# Patient Record
Sex: FEMALE | Race: WHITE | Employment: OTHER | URBAN - METROPOLITAN AREA
[De-identification: names, ages, dates, MRNs, and addresses within clinical notes are randomized per-mention and may not be internally consistent; named-entity substitution may affect disease eponyms.]

---

## 2017-01-13 ENCOUNTER — APPOINTMENT (OUTPATIENT)
Dept: LAB | Facility: HOSPITAL | Age: 72
End: 2017-01-13
Attending: INTERNAL MEDICINE
Payer: MEDICARE

## 2017-01-13 DIAGNOSIS — E04.2 MULTINODULAR THYROID: ICD-10-CM

## 2017-01-13 LAB — TSI SER-ACNC: 1.23 MIU/ML (ref 0.35–5.5)

## 2017-01-13 PROCEDURE — 36415 COLL VENOUS BLD VENIPUNCTURE: CPT

## 2017-01-13 PROCEDURE — 84443 ASSAY THYROID STIM HORMONE: CPT

## 2017-02-06 ENCOUNTER — OFFICE VISIT (OUTPATIENT)
Dept: INTERNAL MEDICINE CLINIC | Facility: CLINIC | Age: 72
End: 2017-02-06

## 2017-02-06 VITALS
TEMPERATURE: 98 F | RESPIRATION RATE: 16 BRPM | BODY MASS INDEX: 34 KG/M2 | HEART RATE: 108 BPM | WEIGHT: 179 LBS | DIASTOLIC BLOOD PRESSURE: 80 MMHG | SYSTOLIC BLOOD PRESSURE: 110 MMHG

## 2017-02-06 DIAGNOSIS — J01.90 ACUTE SINUSITIS, RECURRENCE NOT SPECIFIED, UNSPECIFIED LOCATION: Primary | ICD-10-CM

## 2017-02-06 PROCEDURE — 99213 OFFICE O/P EST LOW 20 MIN: CPT | Performed by: PHYSICIAN ASSISTANT

## 2017-02-06 RX ORDER — AMOXICILLIN AND CLAVULANATE POTASSIUM 875; 125 MG/1; MG/1
1 TABLET, FILM COATED ORAL 2 TIMES DAILY
Qty: 20 TABLET | Refills: 0 | Status: SHIPPED | OUTPATIENT
Start: 2017-02-06 | End: 2017-02-16

## 2017-02-06 NOTE — PROGRESS NOTES
Sandra Kevin is a 70year old female  Patient presents with:  Sinus Problem: cough, PND, sinus pressure.   sx's x 2 weeks       HPI:   Pt states that she has been sick for 2 weeks now  - has thick PND, nasal drainage and productive cough  - worse at n developed, well nourished,in no apparent distress  SKIN: no rashes,no suspicious lesions  HEENT: atraumatic, normocephalic, purulent nasal drainage; + maxillary sinus tenderness; normal oropharynx without tonsillar erythema/edema , no pallor or icterus  NE

## 2017-02-24 ENCOUNTER — TELEPHONE (OUTPATIENT)
Dept: INTERNAL MEDICINE CLINIC | Facility: CLINIC | Age: 72
End: 2017-02-24

## 2017-02-24 NOTE — TELEPHONE ENCOUNTER
Incoming (mail or fax):  fax  Received from:  [facility]-test results  Documentation given to:  Triage-in DR. Srivastava's test results folder.

## 2017-04-28 ENCOUNTER — TELEPHONE (OUTPATIENT)
Dept: INTERNAL MEDICINE CLINIC | Facility: CLINIC | Age: 72
End: 2017-04-28

## 2017-04-28 NOTE — TELEPHONE ENCOUNTER
Pt called today but on message said not available to receive call back today. To advise (or ask further questions) Monday, May 1.

## 2017-04-28 NOTE — TELEPHONE ENCOUNTER
Pt would like verbal ok to start a new diet, [facility] Metabolic Program, Dr Faraz Zaidi ph [phone]. Please advise on Monday May 1 or after. Thank you.

## 2017-05-04 NOTE — TELEPHONE ENCOUNTER
Spoke to pt, she will have Dr Quintanilla Keep forward information on the diet to the office. Will forward upon receipt.

## 2017-05-08 NOTE — TELEPHONE ENCOUNTER
Dr. Piero Camacho called. Advised as below that Dr. Siobhan Jeffery would like information about diet and gave fax information. Dr. Piero Camacho voiced understanding. Stated that is there are questions she can be reached at [phone].

## 2017-10-10 ENCOUNTER — MA CHART PREP (OUTPATIENT)
Dept: FAMILY MEDICINE CLINIC | Facility: CLINIC | Age: 72
End: 2017-10-10

## 2017-10-16 ENCOUNTER — OFFICE VISIT (OUTPATIENT)
Dept: INTERNAL MEDICINE CLINIC | Facility: CLINIC | Age: 72
End: 2017-10-16

## 2017-10-16 VITALS
DIASTOLIC BLOOD PRESSURE: 70 MMHG | SYSTOLIC BLOOD PRESSURE: 100 MMHG | RESPIRATION RATE: 20 BRPM | HEIGHT: 61 IN | BODY MASS INDEX: 27.75 KG/M2 | HEART RATE: 85 BPM | WEIGHT: 147 LBS | OXYGEN SATURATION: 98 %

## 2017-10-16 DIAGNOSIS — L71.9 ACNE ROSACEA: ICD-10-CM

## 2017-10-16 DIAGNOSIS — Z11.59 NEED FOR HEPATITIS C SCREENING TEST: ICD-10-CM

## 2017-10-16 DIAGNOSIS — E78.2 MIXED HYPERLIPIDEMIA: ICD-10-CM

## 2017-10-16 DIAGNOSIS — E66.3 OVERWEIGHT (BMI 25.0-29.9): ICD-10-CM

## 2017-10-16 DIAGNOSIS — I87.2 VENOUS INSUFFICIENCY: ICD-10-CM

## 2017-10-16 DIAGNOSIS — Z12.31 VISIT FOR SCREENING MAMMOGRAM: ICD-10-CM

## 2017-10-16 DIAGNOSIS — R94.31 ABNORMAL EKG: ICD-10-CM

## 2017-10-16 DIAGNOSIS — I10 ESSENTIAL HYPERTENSION: ICD-10-CM

## 2017-10-16 DIAGNOSIS — Z12.11 COLON CANCER SCREENING: Primary | ICD-10-CM

## 2017-10-16 DIAGNOSIS — E04.2 MULTINODULAR THYROID: ICD-10-CM

## 2017-10-16 DIAGNOSIS — Z00.00 ENCOUNTER FOR ANNUAL HEALTH EXAMINATION: ICD-10-CM

## 2017-10-16 DIAGNOSIS — M17.0 PRIMARY OSTEOARTHRITIS OF BOTH KNEES: ICD-10-CM

## 2017-10-16 PROCEDURE — G0008 ADMIN INFLUENZA VIRUS VAC: HCPCS | Performed by: INTERNAL MEDICINE

## 2017-10-16 PROCEDURE — 96160 PT-FOCUSED HLTH RISK ASSMT: CPT | Performed by: INTERNAL MEDICINE

## 2017-10-16 PROCEDURE — 90653 IIV ADJUVANT VACCINE IM: CPT | Performed by: INTERNAL MEDICINE

## 2017-10-16 PROCEDURE — G0439 PPPS, SUBSEQ VISIT: HCPCS | Performed by: INTERNAL MEDICINE

## 2017-10-16 NOTE — PROGRESS NOTES
HPI:   Miriam Frances is a 67year old female who presents for a MA (Medicare Advantage) Supervisit (Once per calendar year).       Annual Physical due on 12/13/2017        Patient Care Team: Patient Care Team:  Pearl Alexander DO as PCP - General Elena Whitmore unspecified whether generalized or localized, lower leg (6/20/2013); Tendinitis; Thyroid nodule; and Thyroid nodule.     She  has a past surgical history that includes knee replacement surgery (7/26/2010); other surgical history (1/11/2010); other (5/22/15) turbinates mildly swollen bilateral  EYES: PERRLA, EOMI, conjunctiva are clear  NECK: supple, no adenopathy, no bruits, +thyromegaly, + right sided  nodules  CHEST: no chest tenderness  BREAST: no masses, no discharge or no axillary lymphadenopathy bilater 1 year ago for varicose veins with Dr Abdoulaye Yuan:   Diagnoses and all orders for this visit:    Encounter for annual health examination    Visit for screening mammogram  -     RICARDO SCREENING BILAT (CPT=77067);  Future    Need for hepatitis C Status     Hearing Problems?: No    Vision Problems? : No    Difficulty walking?: No    Difficulty dressing or bathing?: No    Problems with daily activities? : No    Memory Problems?: No      Fall/Risk Assessment     Do you have 3 or more medical conditio Electrocardiogram date11/28/2016       Colorectal Cancer Screening      Colonoscopy Screen every 10 years Colonoscopy,10 Years due on 07/25/1995 Update Health Maintenance if applicable    Flex Sigmoidoscopy Screen every 10 years No results found for this o Not covered by Medicare Part B No vaccine history found This may be covered with your prescription benefits, but Medicare does not cover unless Medically needed    Zoster  Not covered by Medicare Part B No vaccine history found This may be covered with you

## 2017-10-16 NOTE — PATIENT INSTRUCTIONS
Yuridia Adams's SCREENING SCHEDULE   Tests on this list are recommended by your physician but may not be covered, or covered at this frequency, by your insurer. Please check with your insurance carrier before scheduling to verify coverage.    PREVENT criteria:   • Men who are 73-68 years old and have smoked more than 100 cigarettes in their lifetime   • Anyone with a family history    Colorectal Cancer Screening  Covered up to Age 76     Colonoscopy Screen   Covered every 10 years- more often if abnorm Orders placed or performed in visit on 12/13/16  -FLU VACC PRSV FREE INC ANTIG   Orders placed or performed in visit on 12/03/15  -FLU VACC PRSV FREE INC ANTIG    Please get every year    Pneumococcal 13 (Prevnar)  Covered Once after 65   Orders placed o available in [address])  [email or web address]  This link also has information from the [address] [place] [address] regarding Advance Directives.

## 2017-10-17 ENCOUNTER — MED REC SCAN ONLY (OUTPATIENT)
Dept: INTERNAL MEDICINE CLINIC | Facility: CLINIC | Age: 72
End: 2017-10-17

## 2017-11-07 ENCOUNTER — APPOINTMENT (OUTPATIENT)
Dept: LAB | Facility: HOSPITAL | Age: 72
End: 2017-11-07
Attending: INTERNAL MEDICINE
Payer: MEDICARE

## 2017-11-07 DIAGNOSIS — I10 ESSENTIAL HYPERTENSION: ICD-10-CM

## 2017-11-07 DIAGNOSIS — E04.2 MULTINODULAR THYROID: ICD-10-CM

## 2017-11-07 DIAGNOSIS — E78.2 MIXED HYPERLIPIDEMIA: ICD-10-CM

## 2017-11-07 DIAGNOSIS — Z11.59 NEED FOR HEPATITIS C SCREENING TEST: ICD-10-CM

## 2017-11-07 PROCEDURE — 36415 COLL VENOUS BLD VENIPUNCTURE: CPT

## 2017-11-07 PROCEDURE — 80053 COMPREHEN METABOLIC PANEL: CPT

## 2017-11-07 PROCEDURE — 86803 HEPATITIS C AB TEST: CPT

## 2017-11-07 PROCEDURE — 80061 LIPID PANEL: CPT

## 2017-11-07 PROCEDURE — 84443 ASSAY THYROID STIM HORMONE: CPT

## 2017-11-09 ENCOUNTER — HOSPITAL ENCOUNTER (OUTPATIENT)
Dept: ULTRASOUND IMAGING | Facility: HOSPITAL | Age: 72
Discharge: HOME | End: 2017-11-09
Attending: INTERNAL MEDICINE
Payer: MEDICARE

## 2017-11-09 DIAGNOSIS — E04.2 MULTINODULAR THYROID: ICD-10-CM

## 2017-11-09 PROCEDURE — 76536 US EXAM OF HEAD AND NECK: CPT | Performed by: INTERNAL MEDICINE

## 2017-11-15 ENCOUNTER — HOSPITAL ENCOUNTER (OUTPATIENT)
Dept: MAMMOGRAPHY | Age: 72
Discharge: HOME | End: 2017-11-15
Attending: INTERNAL MEDICINE
Payer: MEDICARE

## 2017-11-15 DIAGNOSIS — Z12.31 VISIT FOR SCREENING MAMMOGRAM: ICD-10-CM

## 2017-11-15 PROCEDURE — 77067 SCR MAMMO BI INCL CAD: CPT | Performed by: INTERNAL MEDICINE

## 2017-12-10 ENCOUNTER — APPOINTMENT (OUTPATIENT)
Dept: LAB | Age: 72
End: 2017-12-10
Attending: INTERNAL MEDICINE
Payer: MEDICARE

## 2017-12-10 PROCEDURE — 82272 OCCULT BLD FECES 1-3 TESTS: CPT

## 2018-02-06 ENCOUNTER — TELEPHONE (OUTPATIENT)
Dept: INTERNAL MEDICINE CLINIC | Facility: CLINIC | Age: 73
End: 2018-02-06

## 2018-07-10 ENCOUNTER — TELEPHONE (OUTPATIENT)
Dept: INTERNAL MEDICINE CLINIC | Facility: CLINIC | Age: 73
End: 2018-07-10

## 2018-08-09 ENCOUNTER — HOSPITAL ENCOUNTER (OUTPATIENT)
Dept: ULTRASOUND IMAGING | Facility: HOSPITAL | Age: 73
Discharge: HOME | End: 2018-08-09
Attending: OTOLARYNGOLOGY
Payer: MEDICARE

## 2018-08-09 DIAGNOSIS — E04.1 THYROID NODULE: ICD-10-CM

## 2018-08-09 PROCEDURE — 76536 US EXAM OF HEAD AND NECK: CPT | Performed by: OTOLARYNGOLOGY

## 2018-08-13 ENCOUNTER — HOSPITAL ENCOUNTER (OUTPATIENT)
Dept: ULTRASOUND IMAGING | Facility: HOSPITAL | Age: 73
Discharge: HOME | End: 2018-08-13
Attending: OTOLARYNGOLOGY
Payer: MEDICARE

## 2018-08-13 DIAGNOSIS — E04.1 THYROID NODULE: ICD-10-CM

## 2018-08-13 DIAGNOSIS — E04.2 MULTINODULAR THYROID: ICD-10-CM

## 2018-08-13 PROCEDURE — 76942 ECHO GUIDE FOR BIOPSY: CPT | Performed by: OTOLARYNGOLOGY

## 2018-08-13 PROCEDURE — 88173 CYTOPATH EVAL FNA REPORT: CPT | Performed by: OTOLARYNGOLOGY

## 2018-08-13 PROCEDURE — 10022 US FNA THYROID (CPT=10022/76942): CPT | Performed by: OTOLARYNGOLOGY

## 2018-08-20 NOTE — PROGRESS NOTES
Per phone consent [phone] Home. Notified of results and recommendations and verbalized understanding.  Future order placed

## 2018-09-06 ENCOUNTER — TELEPHONE (OUTPATIENT)
Dept: INTERNAL MEDICINE CLINIC | Facility: CLINIC | Age: 73
End: 2018-09-06

## 2018-09-06 NOTE — TELEPHONE ENCOUNTER
Incoming (mail or fax):   Fax  Received from: [facility]  Documentation given to: Triage

## 2018-09-07 NOTE — TELEPHONE ENCOUNTER
Received a fax from Sonia Huerta, the Coordinator at [place] inquiring about a claim from 02/27/18 for services at [facility], questioning if Dr. Eun Quick directed the service.  Indicated on the fax back to Sonia Huerta that Dr. Eun Quick has never seen this radha

## 2018-09-28 NOTE — PROGRESS NOTES
HPI:   Alejandro Dukes is a 68year old female who presents with UTI symptoms.      Symptoms started 1 week ago  +Dysuria  +suprapubic pressure  Denies hematuria  +Urinary frequency  +urinary urgency  Denies fever/chills  + low back pains but denies flan localized, lower leg 6/20/2013    Specify: LT Log Date: 07/26/2011    • Tendinitis    • Thyroid nodule    • Thyroid nodule       Past Surgical History:  7/26/2010: KNEE REPLACEMENT SURGERY      Comment:  L knee unicompartment replacement, unicondylar knee urine culture-pt unable to void again in office; gave wipe and urine cup to take home and bring back  abx with probiotic  Drink plenty of water, avoid holding bladder, wipe front to back  RTC in 1 week if not better or sooner if needed  Medicare physical s

## 2018-10-01 ENCOUNTER — OFFICE VISIT (OUTPATIENT)
Dept: FAMILY MEDICINE CLINIC | Facility: CLINIC | Age: 73
End: 2018-10-01

## 2018-10-01 VITALS
BODY MASS INDEX: 27.79 KG/M2 | DIASTOLIC BLOOD PRESSURE: 76 MMHG | OXYGEN SATURATION: 98 % | HEIGHT: 62 IN | RESPIRATION RATE: 16 BRPM | WEIGHT: 151 LBS | TEMPERATURE: 97 F | HEART RATE: 92 BPM | SYSTOLIC BLOOD PRESSURE: 118 MMHG

## 2018-10-01 DIAGNOSIS — R30.0 DYSURIA: Primary | ICD-10-CM

## 2018-10-01 PROCEDURE — 81003 URINALYSIS AUTO W/O SCOPE: CPT | Performed by: PHYSICIAN ASSISTANT

## 2018-10-01 PROCEDURE — 99203 OFFICE O/P NEW LOW 30 MIN: CPT | Performed by: PHYSICIAN ASSISTANT

## 2018-10-01 PROCEDURE — 87086 URINE CULTURE/COLONY COUNT: CPT | Performed by: PHYSICIAN ASSISTANT

## 2018-10-01 RX ORDER — SULFAMETHOXAZOLE AND TRIMETHOPRIM 800; 160 MG/1; MG/1
1 TABLET ORAL 2 TIMES DAILY
Qty: 10 TABLET | Refills: 0 | Status: SHIPPED | OUTPATIENT
Start: 2018-10-01 | End: 2018-10-16 | Stop reason: ALTCHOICE

## 2018-10-29 ENCOUNTER — OFFICE VISIT (OUTPATIENT)
Dept: FAMILY MEDICINE CLINIC | Facility: CLINIC | Age: 73
End: 2018-10-29
Payer: MEDICARE

## 2018-10-29 VITALS
HEART RATE: 80 BPM | SYSTOLIC BLOOD PRESSURE: 126 MMHG | WEIGHT: 151.63 LBS | HEIGHT: 61.5 IN | RESPIRATION RATE: 14 BRPM | DIASTOLIC BLOOD PRESSURE: 88 MMHG | BODY MASS INDEX: 28.26 KG/M2 | TEMPERATURE: 98 F

## 2018-10-29 DIAGNOSIS — L71.9 ACNE ROSACEA: ICD-10-CM

## 2018-10-29 DIAGNOSIS — E04.2 MULTINODULAR THYROID: ICD-10-CM

## 2018-10-29 DIAGNOSIS — Z23 FLU VACCINE NEED: ICD-10-CM

## 2018-10-29 DIAGNOSIS — I87.2 VENOUS INSUFFICIENCY: ICD-10-CM

## 2018-10-29 DIAGNOSIS — E53.8 B12 DEFICIENCY: ICD-10-CM

## 2018-10-29 DIAGNOSIS — Z12.11 COLON CANCER SCREENING: ICD-10-CM

## 2018-10-29 DIAGNOSIS — I10 ESSENTIAL HYPERTENSION: ICD-10-CM

## 2018-10-29 DIAGNOSIS — E55.9 VITAMIN D DEFICIENCY: ICD-10-CM

## 2018-10-29 DIAGNOSIS — Z12.31 ENCOUNTER FOR SCREENING MAMMOGRAM FOR HIGH-RISK PATIENT: ICD-10-CM

## 2018-10-29 DIAGNOSIS — E78.00 PURE HYPERCHOLESTEROLEMIA: ICD-10-CM

## 2018-10-29 DIAGNOSIS — Z00.00 ENCOUNTER FOR ANNUAL HEALTH EXAMINATION: Primary | ICD-10-CM

## 2018-10-29 DIAGNOSIS — M15.8 OTHER OSTEOARTHRITIS INVOLVING MULTIPLE JOINTS: ICD-10-CM

## 2018-10-29 PROCEDURE — G0008 ADMIN INFLUENZA VIRUS VAC: HCPCS | Performed by: FAMILY MEDICINE

## 2018-10-29 PROCEDURE — G0439 PPPS, SUBSEQ VISIT: HCPCS | Performed by: FAMILY MEDICINE

## 2018-10-29 PROCEDURE — 90653 IIV ADJUVANT VACCINE IM: CPT | Performed by: FAMILY MEDICINE

## 2018-10-29 PROCEDURE — 96160 PT-FOCUSED HLTH RISK ASSMT: CPT | Performed by: FAMILY MEDICINE

## 2018-10-29 NOTE — PROGRESS NOTES
HPI:   Irish Bains is a 68year old female who presents for a MA (Medicare Advantage) Supervisit (Once per calendar year).       Her last annual assessment has been over 1 year: Annual Physical due on 10/16/2018         Fall/Risk Assessment   She h sees DERM      Venous insufficiency- stable monitor      Overweight (BMI 25.0-29. 9)- discussed diet and exercise     Wt Readings from Last 3 Encounters:  10/29/18 : 151 lb 9.6 oz  10/01/18 : 151 lb  11/20/17 : 145 lb     Last Cholesterol Labs:   Lab Result includes knee replacement surgery (2010); other surgical history (2010); other (5/22/15); and .     Her family history includes Breast Cancer in an other family member; CAD in her father; Cancer in her father and another family member; Heart D Both Eyes Chart Acuity: 20/20   Able To Tolerate Visual Acuity: Yes      General Appearance:  Alert, cooperative, no distress, appears stated age   Head:  Normocephalic, without obvious abnormality, atraumatic   Eyes:  PERRL, conjunctiva/corneas clear, EOM is a 68year old female who presents for a Medicare Assessment.      PLAN SUMMARY:   Diagnoses and all orders for this visit:    Encounter for annual health examination    Acne rosacea- stable cpm    Other osteoarthritis involving multiple joints- stable cp Sugar (FSB)Annually Glucose (mg/dL)   Date Value   11/07/2017 83     GLUCOSE (mg/dL)   Date Value   10/07/2013 91          Cardiovascular Disease Screening     LDL Annually LDL CHOLESTROL (mg/dL)   Date Value   10/07/2013 86     LDL Cholesterol (mg/dL)   D birthday    Hepatitis B for Moderate/High Risk No vaccine history found Medium/high risk factors:   End-stage renal disease   Hemophiliacs who received Factor VIII or IX concentrates   Clients of institutions for the mentally retarded   Persons who live in

## 2018-10-29 NOTE — PATIENT INSTRUCTIONS
Bear Adams's SCREENING SCHEDULE   Tests on this list are recommended by your physician but may not be covered, or covered at this frequency, by your insurer. Please check with your insurance carrier before scheduling to verify coverage.    PREVENT 73-68 years old and have smoked more than 100 cigarettes in their lifetime   • Anyone with a family history    Colorectal Cancer Screening  Covered up to Age 76     Colonoscopy Screen   Covered every 10 years- more often if abnormal Colonoscopy due on 07/2 Please get this Mammogram regularly   Immunizations      Influenza  Covered Annually Orders placed or performed in visit on 12/13/16   • FLU VACC [address] ANTIG   Orders placed or performed in visit on 12/03/15   • FLU VACC PRSV FREE [facility] ANTIG    Plea review and print using their home computer and printer. (the forms are also available in [address] [facility])  [email or web address]  This link also has information from the [facility] regarding Advance Directives.

## 2018-12-03 ENCOUNTER — APPOINTMENT (OUTPATIENT)
Dept: LAB | Facility: HOSPITAL | Age: 73
End: 2018-12-03
Attending: FAMILY MEDICINE
Payer: MEDICARE

## 2018-12-03 DIAGNOSIS — Z12.11 COLON CANCER SCREENING: ICD-10-CM

## 2018-12-03 PROCEDURE — 82274 ASSAY TEST FOR BLOOD FECAL: CPT

## 2018-12-07 ENCOUNTER — APPOINTMENT (OUTPATIENT)
Dept: LAB | Age: 73
End: 2018-12-07
Attending: FAMILY MEDICINE
Payer: MEDICARE

## 2018-12-07 DIAGNOSIS — E04.2 MULTINODULAR THYROID: ICD-10-CM

## 2018-12-07 DIAGNOSIS — E78.00 PURE HYPERCHOLESTEROLEMIA: ICD-10-CM

## 2018-12-07 DIAGNOSIS — E53.8 B12 DEFICIENCY: ICD-10-CM

## 2018-12-07 DIAGNOSIS — E55.9 VITAMIN D DEFICIENCY: ICD-10-CM

## 2018-12-07 DIAGNOSIS — I10 ESSENTIAL HYPERTENSION: ICD-10-CM

## 2018-12-07 PROCEDURE — 80061 LIPID PANEL: CPT

## 2018-12-07 PROCEDURE — 82607 VITAMIN B-12: CPT

## 2018-12-07 PROCEDURE — 84443 ASSAY THYROID STIM HORMONE: CPT

## 2018-12-07 PROCEDURE — 82306 VITAMIN D 25 HYDROXY: CPT

## 2018-12-07 PROCEDURE — 83735 ASSAY OF MAGNESIUM: CPT

## 2018-12-07 PROCEDURE — 84439 ASSAY OF FREE THYROXINE: CPT

## 2018-12-07 PROCEDURE — 36415 COLL VENOUS BLD VENIPUNCTURE: CPT

## 2018-12-07 PROCEDURE — 80053 COMPREHEN METABOLIC PANEL: CPT

## 2019-03-25 ENCOUNTER — OFFICE VISIT (OUTPATIENT)
Dept: FAMILY MEDICINE CLINIC | Facility: CLINIC | Age: 74
End: 2019-03-25
Payer: COMMERCIAL

## 2019-03-25 VITALS
DIASTOLIC BLOOD PRESSURE: 68 MMHG | SYSTOLIC BLOOD PRESSURE: 120 MMHG | OXYGEN SATURATION: 97 % | TEMPERATURE: 98 F | BODY MASS INDEX: 28.32 KG/M2 | HEART RATE: 82 BPM | HEIGHT: 61 IN | WEIGHT: 150 LBS

## 2019-03-25 DIAGNOSIS — J02.9 SORE THROAT: Primary | ICD-10-CM

## 2019-03-25 DIAGNOSIS — J02.0 STREP PHARYNGITIS: ICD-10-CM

## 2019-03-25 LAB — CONTROL LINE PRESENT WITH A CLEAR BACKGROUND (YES/NO): YES YES/NO

## 2019-03-25 PROCEDURE — 87880 STREP A ASSAY W/OPTIC: CPT | Performed by: FAMILY MEDICINE

## 2019-03-25 PROCEDURE — 99213 OFFICE O/P EST LOW 20 MIN: CPT | Performed by: FAMILY MEDICINE

## 2019-03-25 RX ORDER — CEFDINIR 300 MG/1
300 CAPSULE ORAL 2 TIMES DAILY
Qty: 20 CAPSULE | Refills: 0 | Status: SHIPPED | OUTPATIENT
Start: 2019-03-25 | End: 2019-04-08

## 2019-03-25 NOTE — PROGRESS NOTES
CHIEF COMPLAINT:   Patient presents with:  Sore Throat: x 1 dy       HPI:   Miriam Frances is a 68year old female presents to clinic with symptoms of sore throat. Patient has had for 1 days. Symptoms have been getting worse since onset.   Patient repor Specify: LT Log Date: 07/26/2011    • Tendinitis    • Thyroid nodule    • Thyroid nodule       Social History:  Social History    Tobacco Use      Smoking status: Never Smoker      Smokeless tobacco: Never Used    Alcohol use: No      Alcohol/week: 0.0 Refills for this Visit:  Requested Prescriptions     Signed Prescriptions Disp Refills   • cefdinir 300 MG Oral Cap 20 capsule 0     Sig: Take 1 capsule (300 mg total) by mouth 2 (two) times daily.        Imaging & Consults:  None      Risks, benefits, comp

## 2019-03-25 NOTE — PATIENT INSTRUCTIONS
Take antibiotics with food and plenty of water. Eat yogurt or take probiotic daily. (Morales Kitchen is a good example of an OTC probiotic)  Make sure to finish the entire antibiotic treatment. Increase fluids and rest.   Use otc meds as needed for comfort.    Ibu

## 2019-03-27 ENCOUNTER — TELEPHONE (OUTPATIENT)
Dept: FAMILY MEDICINE CLINIC | Facility: CLINIC | Age: 74
End: 2019-03-27

## 2019-03-27 RX ORDER — AMOXICILLIN 500 MG/1
500 CAPSULE ORAL 3 TIMES DAILY
Qty: 30 CAPSULE | Refills: 0 | Status: SHIPPED | OUTPATIENT
Start: 2019-03-27 | End: 2019-04-06

## 2019-03-27 NOTE — TELEPHONE ENCOUNTER
Patient called the clinic and believes she is having a reaction to the Cefdinir she was prescribed on 3/25/19 for strep pharyngitis.   The patient reports for the past 2 days she has had black stools and reports she had a headache this morning so severe she

## 2019-10-23 ENCOUNTER — PATIENT OUTREACH (OUTPATIENT)
Dept: FAMILY MEDICINE CLINIC | Facility: CLINIC | Age: 74
End: 2019-10-23

## 2019-11-24 ENCOUNTER — MA CHART PREP (OUTPATIENT)
Dept: FAMILY MEDICINE CLINIC | Facility: CLINIC | Age: 74
End: 2019-11-24

## 2019-11-24 NOTE — TELEPHONE ENCOUNTER
Chart reviewed as MA Chart Prep for upcoming Medicare Advantage visit scheduled for 11/26/2019. No new potential HCC diagnoses identified.

## 2019-11-26 ENCOUNTER — OFFICE VISIT (OUTPATIENT)
Dept: FAMILY MEDICINE CLINIC | Facility: CLINIC | Age: 74
End: 2019-11-26
Payer: COMMERCIAL

## 2019-11-26 VITALS
OXYGEN SATURATION: 98 % | SYSTOLIC BLOOD PRESSURE: 132 MMHG | RESPIRATION RATE: 18 BRPM | TEMPERATURE: 98 F | HEIGHT: 61 IN | BODY MASS INDEX: 29.64 KG/M2 | DIASTOLIC BLOOD PRESSURE: 74 MMHG | WEIGHT: 157 LBS | HEART RATE: 86 BPM

## 2019-11-26 DIAGNOSIS — Z12.11 COLON CANCER SCREENING: ICD-10-CM

## 2019-11-26 DIAGNOSIS — I87.2 VENOUS INSUFFICIENCY: ICD-10-CM

## 2019-11-26 DIAGNOSIS — L71.9 ACNE ROSACEA: ICD-10-CM

## 2019-11-26 DIAGNOSIS — M15.9 PRIMARY OSTEOARTHRITIS INVOLVING MULTIPLE JOINTS: ICD-10-CM

## 2019-11-26 DIAGNOSIS — Z12.31 ENCOUNTER FOR SCREENING MAMMOGRAM FOR HIGH-RISK PATIENT: ICD-10-CM

## 2019-11-26 DIAGNOSIS — Z00.00 ENCOUNTER FOR ANNUAL HEALTH EXAMINATION: Primary | ICD-10-CM

## 2019-11-26 DIAGNOSIS — I10 ESSENTIAL HYPERTENSION: ICD-10-CM

## 2019-11-26 DIAGNOSIS — Z78.0 ASYMPTOMATIC MENOPAUSE: ICD-10-CM

## 2019-11-26 DIAGNOSIS — E04.2 MULTINODULAR THYROID: ICD-10-CM

## 2019-11-26 DIAGNOSIS — E66.3 OVERWEIGHT (BMI 25.0-29.9): ICD-10-CM

## 2019-11-26 DIAGNOSIS — E53.8 B12 DEFICIENCY: ICD-10-CM

## 2019-11-26 DIAGNOSIS — E78.2 MIXED HYPERLIPIDEMIA: ICD-10-CM

## 2019-11-26 DIAGNOSIS — Z13.820 SCREENING FOR OSTEOPOROSIS: ICD-10-CM

## 2019-11-26 DIAGNOSIS — Z23 NEED FOR VACCINATION: ICD-10-CM

## 2019-11-26 DIAGNOSIS — E55.9 VITAMIN D DEFICIENCY: ICD-10-CM

## 2019-11-26 PROCEDURE — 90662 IIV NO PRSV INCREASED AG IM: CPT | Performed by: FAMILY MEDICINE

## 2019-11-26 PROCEDURE — 90471 IMMUNIZATION ADMIN: CPT | Performed by: FAMILY MEDICINE

## 2019-11-26 PROCEDURE — 96160 PT-FOCUSED HLTH RISK ASSMT: CPT | Performed by: FAMILY MEDICINE

## 2019-11-26 PROCEDURE — 99397 PER PM REEVAL EST PAT 65+ YR: CPT | Performed by: FAMILY MEDICINE

## 2019-11-26 PROCEDURE — G0439 PPPS, SUBSEQ VISIT: HCPCS | Performed by: FAMILY MEDICINE

## 2019-11-26 NOTE — PROGRESS NOTES
HPI:   Ethan Joseph is a 76year old female who presents for a MA (Medicare Advantage) Supervisit (Once per calendar year).       Her last annual assessment has been over 1 year: Annual Physical due on 10/29/2019         Fall/Risk Assessment   She h 154 lb (69.9 kg)  03/25/19 : 150 lb (68 kg)     Last Cholesterol Labs:   Lab Results   Component Value Date    CHOLEST 160 12/07/2018    HDL 60 (H) 12/07/2018    LDL 76 12/07/2018    TRIG 121 12/07/2018          Last Chemistry Labs:   Lab Results   Compone maternal grandmother and mother; High Cholesterol in her father; Leukemia in an other family member; Obesity in her brother and mother; Stroke in her mother. SOCIAL HISTORY:   She  reports that she has never smoked.  She has never used smokeless tobacco. teeth and gums normal   Neck: Supple, symmetrical, trachea midline, no adenopathy;  thyroid: not enlarged, symmetric, no tenderness/mass/nodules; no carotid bruit or JVD   Back:   Symmetric, no curvature, ROM normal, no CVA tenderness   Lungs:   Clear to a hypertension- stable cpm     Primary osteoarthritis involving multiple joints- stable monitor     Acne rosacea- stable monitor     Vitamin D deficiency- check labs   -     VITAMIN D, 25-HYDROXY; Future    B12 deficiency- check labs   -     VITAMIN B12; Fut Initial Preventative Physical Exam only, or if medically necessary Electrocardiogram date11/28/2016       Colorectal Cancer Screening      Colonoscopy Screen every 10 years Colonoscopy due on 07/25/1995 Update Health Maintenance if applicable    Flex Sigmo Homosexual men   Illicit injectable drug abusers     Tetanus Toxoid  Only covered with a cut with metal- TD and TDaP Not covered by Medicare Part B No vaccine history found This may be covered with your prescription benefits, but Medicare does not cover

## 2019-11-26 NOTE — PATIENT INSTRUCTIONS
Milan Adams's SCREENING SCHEDULE   Tests on this list are recommended by your physician but may not be covered, or covered at this frequency, by your insurer. Please check with your insurance carrier before scheduling to verify coverage.    PREVENT 73-68 years old and have smoked more than 100 cigarettes in their lifetime   • Anyone with a family history    Colorectal Cancer Screening  Covered up to Age 76     Colonoscopy Screen   Covered every 10 years- more often if abnormal Colonoscopy due on 07/2 Please get this Mammogram regularly   Immunizations      Influenza  Covered Annually Orders placed or performed in visit on 12/13/16   • FLU VACC [address] ANTIG   Orders placed or performed in visit on 12/03/15   • FLU VACC PRSV FREE [facility] ANTIG    Plea review and print using their home computer and printer. (the forms are also available in [facility])  [email or web address]  This link also has information from the [facility] regarding Advance Directives.

## 2019-12-03 ENCOUNTER — APPOINTMENT (OUTPATIENT)
Dept: LAB | Facility: HOSPITAL | Age: 74
End: 2019-12-03
Attending: FAMILY MEDICINE
Payer: MEDICARE

## 2019-12-03 DIAGNOSIS — Z12.11 COLON CANCER SCREENING: ICD-10-CM

## 2019-12-03 PROCEDURE — 82274 ASSAY TEST FOR BLOOD FECAL: CPT

## 2019-12-12 ENCOUNTER — LAB ENCOUNTER (OUTPATIENT)
Dept: LAB | Age: 74
End: 2019-12-12
Attending: FAMILY MEDICINE
Payer: MEDICARE

## 2019-12-12 DIAGNOSIS — E55.9 VITAMIN D DEFICIENCY: ICD-10-CM

## 2019-12-12 DIAGNOSIS — E04.2 MULTINODULAR THYROID: ICD-10-CM

## 2019-12-12 DIAGNOSIS — E53.8 B12 DEFICIENCY: ICD-10-CM

## 2019-12-12 DIAGNOSIS — I87.2 VENOUS INSUFFICIENCY: ICD-10-CM

## 2019-12-12 DIAGNOSIS — E78.2 MIXED HYPERLIPIDEMIA: ICD-10-CM

## 2019-12-12 PROCEDURE — 36415 COLL VENOUS BLD VENIPUNCTURE: CPT

## 2019-12-12 PROCEDURE — 84439 ASSAY OF FREE THYROXINE: CPT

## 2019-12-12 PROCEDURE — 85025 COMPLETE CBC W/AUTO DIFF WBC: CPT

## 2019-12-12 PROCEDURE — 82306 VITAMIN D 25 HYDROXY: CPT

## 2019-12-12 PROCEDURE — 84443 ASSAY THYROID STIM HORMONE: CPT

## 2019-12-12 PROCEDURE — 80061 LIPID PANEL: CPT

## 2019-12-12 PROCEDURE — 82607 VITAMIN B-12: CPT

## 2019-12-12 PROCEDURE — 80053 COMPREHEN METABOLIC PANEL: CPT

## 2019-12-23 ENCOUNTER — HOSPITAL ENCOUNTER (OUTPATIENT)
Dept: MAMMOGRAPHY | Age: 74
Discharge: HOME | End: 2019-12-23
Attending: FAMILY MEDICINE
Payer: MEDICARE

## 2019-12-23 ENCOUNTER — HOSPITAL ENCOUNTER (OUTPATIENT)
Dept: BONE DENSITY | Age: 74
Discharge: HOME | End: 2019-12-23
Attending: FAMILY MEDICINE
Payer: MEDICARE

## 2019-12-23 DIAGNOSIS — Z78.0 ASYMPTOMATIC MENOPAUSE: ICD-10-CM

## 2019-12-23 DIAGNOSIS — Z12.31 ENCOUNTER FOR SCREENING MAMMOGRAM FOR HIGH-RISK PATIENT: ICD-10-CM

## 2019-12-23 DIAGNOSIS — Z13.820 SCREENING FOR OSTEOPOROSIS: ICD-10-CM

## 2019-12-23 PROCEDURE — 77080 DXA BONE DENSITY AXIAL: CPT | Performed by: FAMILY MEDICINE

## 2019-12-23 PROCEDURE — 77067 SCR MAMMO BI INCL CAD: CPT | Performed by: FAMILY MEDICINE

## 2019-12-23 PROCEDURE — 77063 BREAST TOMOSYNTHESIS BI: CPT | Performed by: FAMILY MEDICINE

## 2020-01-10 ENCOUNTER — OFFICE VISIT (OUTPATIENT)
Dept: FAMILY MEDICINE CLINIC | Facility: CLINIC | Age: 75
End: 2020-01-10
Payer: MEDICARE

## 2020-01-10 VITALS
HEIGHT: 61 IN | SYSTOLIC BLOOD PRESSURE: 110 MMHG | TEMPERATURE: 99 F | BODY MASS INDEX: 29.07 KG/M2 | DIASTOLIC BLOOD PRESSURE: 70 MMHG | HEART RATE: 91 BPM | WEIGHT: 154 LBS | OXYGEN SATURATION: 98 %

## 2020-01-10 DIAGNOSIS — J02.9 SORE THROAT: ICD-10-CM

## 2020-01-10 DIAGNOSIS — J01.00 ACUTE NON-RECURRENT MAXILLARY SINUSITIS: Primary | ICD-10-CM

## 2020-01-10 LAB
CONTROL LINE PRESENT WITH A CLEAR BACKGROUND (YES/NO): YES YES/NO
KIT LOT #: NORMAL NUMERIC
STREP GRP A CUL-SCR: NEGATIVE

## 2020-01-10 PROCEDURE — 87880 STREP A ASSAY W/OPTIC: CPT | Performed by: FAMILY MEDICINE

## 2020-01-10 PROCEDURE — 99213 OFFICE O/P EST LOW 20 MIN: CPT | Performed by: FAMILY MEDICINE

## 2020-01-10 RX ORDER — AMOXICILLIN 875 MG/1
875 TABLET, COATED ORAL 2 TIMES DAILY
Qty: 20 TABLET | Refills: 0 | Status: SHIPPED | OUTPATIENT
Start: 2020-01-10 | End: 2020-01-20

## 2020-01-10 NOTE — PROGRESS NOTES
CHIEF COMPLAINT:   Patient presents with:  Sore Throat: sore throat x 2 weeks. HPI:   Annmarie Nance is a 76year old female who presents for sinus congestion for  2  weeks. Symptoms have been worsening since onset.  Sinus congestion/pain is descr Date: 2011    • Tendinitis    • Thyroid nodule    • Thyroid nodule       Past Surgical History:   Procedure Laterality Date   • KNEE REPLACEMENT SURGERY  2010    L knee unicompartment replacement, unicondylar knee replacement   •      • OTHE moist. No visible dental caries. Posterior pharynx is mildly erythematous. no exudates. NECK: supple, non-tender  LUNGS: clear to auscultation bilaterally, no wheezes or rhonchi. Breathing is non labored.   CARDIO: RRR without murmur  EXTREMITIES: no cyano

## 2020-01-10 NOTE — PATIENT INSTRUCTIONS
Take antibiotics with food and plenty of water. Eat yogurt or take probiotic daily. (Yessy Dunlap is a good example of an OTC probiotic)  Make sure to finish the entire antibiotic treatment.   Increase fluids and rest.     Use OTC meds for comfort as needed--  U

## 2020-03-03 ENCOUNTER — OFFICE VISIT (OUTPATIENT)
Dept: FAMILY MEDICINE CLINIC | Facility: CLINIC | Age: 75
End: 2020-03-03
Payer: MEDICARE

## 2020-03-03 VITALS
HEIGHT: 61 IN | HEART RATE: 102 BPM | WEIGHT: 155 LBS | TEMPERATURE: 98 F | DIASTOLIC BLOOD PRESSURE: 72 MMHG | SYSTOLIC BLOOD PRESSURE: 120 MMHG | OXYGEN SATURATION: 98 % | RESPIRATION RATE: 20 BRPM | BODY MASS INDEX: 29.27 KG/M2

## 2020-03-03 DIAGNOSIS — R30.0 DYSURIA: Primary | ICD-10-CM

## 2020-03-03 LAB
MULTISTIX LOT#: NORMAL NUMERIC
PH, URINE: 5 (ref 4.5–8)
SPECIFIC GRAVITY: >=1.03 (ref 1–1.03)
URINE-COLOR: YELLOW

## 2020-03-03 PROCEDURE — 87086 URINE CULTURE/COLONY COUNT: CPT | Performed by: INTERNAL MEDICINE

## 2020-03-03 PROCEDURE — 81003 URINALYSIS AUTO W/O SCOPE: CPT | Performed by: INTERNAL MEDICINE

## 2020-03-03 PROCEDURE — 87088 URINE BACTERIA CULTURE: CPT | Performed by: INTERNAL MEDICINE

## 2020-03-03 PROCEDURE — 87186 SC STD MICRODIL/AGAR DIL: CPT | Performed by: INTERNAL MEDICINE

## 2020-03-03 PROCEDURE — 99213 OFFICE O/P EST LOW 20 MIN: CPT | Performed by: INTERNAL MEDICINE

## 2020-03-03 RX ORDER — MULTIVIT-MIN/IRON/FOLIC ACID/K 18-600-40
1 CAPSULE ORAL DAILY
COMMUNITY
End: 2020-04-06 | Stop reason: CLARIF

## 2020-03-03 RX ORDER — UBIDECARENONE 75 MG
250 CAPSULE ORAL DAILY
COMMUNITY
End: 2020-04-06 | Stop reason: CLARIF

## 2020-03-03 RX ORDER — AMOXICILLIN 875 MG/1
875 TABLET, COATED ORAL 2 TIMES DAILY
Qty: 14 TABLET | Refills: 0 | Status: SHIPPED | OUTPATIENT
Start: 2020-03-03 | End: 2020-03-10

## 2020-03-04 NOTE — PROGRESS NOTES
HPI:   Ada Jones is a 76year old female who presents with urinary symptoms for 3-4 days. Complains of + urinary frequency, + urgency, some dysuria, + suprapubic pressure.    + some dull low back \"pressure\" since her symptoms above   Denies hem 6/20/2013    Specify: LT Log Date: 07/26/2011    • Tendinitis    • Thyroid nodule    • Thyroid nodule       Social History    Tobacco Use      Smoking status: Never Smoker      Smokeless tobacco: Never Used    Alcohol use: No      Alcohol/week: 0.0 standar

## 2020-03-27 ENCOUNTER — HOSPITAL ENCOUNTER (INPATIENT)
Facility: HOSPITAL | Age: 75
LOS: 4 days | Discharge: HOME | DRG: 065 | End: 2020-03-31
Attending: EMERGENCY MEDICINE | Admitting: HOSPITALIST
Payer: MEDICARE

## 2020-03-27 ENCOUNTER — APPOINTMENT (OUTPATIENT)
Dept: CT IMAGING | Facility: HOSPITAL | Age: 75
DRG: 065 | End: 2020-03-27
Attending: EMERGENCY MEDICINE
Payer: MEDICARE

## 2020-03-27 ENCOUNTER — APPOINTMENT (OUTPATIENT)
Dept: GENERAL RADIOLOGY | Facility: HOSPITAL | Age: 75
DRG: 065 | End: 2020-03-27
Attending: EMERGENCY MEDICINE
Payer: MEDICARE

## 2020-03-27 ENCOUNTER — APPOINTMENT (OUTPATIENT)
Dept: MRI IMAGING | Facility: HOSPITAL | Age: 75
DRG: 065 | End: 2020-03-27
Attending: NURSE PRACTITIONER
Payer: MEDICARE

## 2020-03-27 ENCOUNTER — TELEPHONE (OUTPATIENT)
Dept: FAMILY MEDICINE CLINIC | Facility: CLINIC | Age: 75
End: 2020-03-27

## 2020-03-27 ENCOUNTER — APPOINTMENT (OUTPATIENT)
Dept: CV DIAGNOSTICS | Facility: HOSPITAL | Age: 75
DRG: 065 | End: 2020-03-27
Attending: NURSE PRACTITIONER
Payer: MEDICARE

## 2020-03-27 DIAGNOSIS — R41.82 ALTERED MENTAL STATUS, UNSPECIFIED ALTERED MENTAL STATUS TYPE: Primary | ICD-10-CM

## 2020-03-27 DIAGNOSIS — I63.9 ACUTE CVA (CEREBROVASCULAR ACCIDENT) (HCC): ICD-10-CM

## 2020-03-27 PROBLEM — R73.9 HYPERGLYCEMIA: Status: ACTIVE | Noted: 2020-03-27

## 2020-03-27 PROBLEM — R79.89 AZOTEMIA: Status: ACTIVE | Noted: 2020-03-27

## 2020-03-27 PROCEDURE — 70498 CT ANGIOGRAPHY NECK: CPT | Performed by: EMERGENCY MEDICINE

## 2020-03-27 PROCEDURE — 0042T CT CEREBRAL PERFUSION (CPT=0042T): CPT | Performed by: EMERGENCY MEDICINE

## 2020-03-27 PROCEDURE — 93306 TTE W/DOPPLER COMPLETE: CPT | Performed by: NURSE PRACTITIONER

## 2020-03-27 PROCEDURE — 70496 CT ANGIOGRAPHY HEAD: CPT | Performed by: EMERGENCY MEDICINE

## 2020-03-27 PROCEDURE — 70551 MRI BRAIN STEM W/O DYE: CPT | Performed by: NURSE PRACTITIONER

## 2020-03-27 PROCEDURE — 70450 CT HEAD/BRAIN W/O DYE: CPT | Performed by: EMERGENCY MEDICINE

## 2020-03-27 PROCEDURE — 71045 X-RAY EXAM CHEST 1 VIEW: CPT | Performed by: EMERGENCY MEDICINE

## 2020-03-27 PROCEDURE — 99223 1ST HOSP IP/OBS HIGH 75: CPT | Performed by: INTERNAL MEDICINE

## 2020-03-27 PROCEDURE — 99291 CRITICAL CARE FIRST HOUR: CPT | Performed by: OTHER

## 2020-03-27 RX ORDER — ACETAMINOPHEN 325 MG/1
650 TABLET ORAL EVERY 4 HOURS PRN
Status: DISCONTINUED | OUTPATIENT
Start: 2020-03-27 | End: 2020-03-31

## 2020-03-27 RX ORDER — ACETAMINOPHEN 650 MG/1
650 SUPPOSITORY RECTAL EVERY 4 HOURS PRN
Status: DISCONTINUED | OUTPATIENT
Start: 2020-03-27 | End: 2020-03-31

## 2020-03-27 RX ORDER — BISACODYL 10 MG
10 SUPPOSITORY, RECTAL RECTAL
Status: DISCONTINUED | OUTPATIENT
Start: 2020-03-27 | End: 2020-03-31

## 2020-03-27 RX ORDER — LISINOPRIL 10 MG/1
10 TABLET ORAL DAILY
COMMUNITY
End: 2020-05-07

## 2020-03-27 RX ORDER — ASPIRIN 300 MG
300 SUPPOSITORY, RECTAL RECTAL DAILY
Status: DISCONTINUED | OUTPATIENT
Start: 2020-03-28 | End: 2020-03-31

## 2020-03-27 RX ORDER — DEXTROSE AND SODIUM CHLORIDE 5; .45 G/100ML; G/100ML
INJECTION, SOLUTION INTRAVENOUS CONTINUOUS
Status: DISCONTINUED | OUTPATIENT
Start: 2020-03-27 | End: 2020-03-28

## 2020-03-27 RX ORDER — DOCUSATE SODIUM 100 MG/1
100 CAPSULE, LIQUID FILLED ORAL 2 TIMES DAILY
Status: DISCONTINUED | OUTPATIENT
Start: 2020-03-27 | End: 2020-03-31

## 2020-03-27 RX ORDER — ONDANSETRON 2 MG/ML
4 INJECTION INTRAMUSCULAR; INTRAVENOUS EVERY 6 HOURS PRN
Status: DISCONTINUED | OUTPATIENT
Start: 2020-03-27 | End: 2020-03-31

## 2020-03-27 RX ORDER — ATORVASTATIN CALCIUM 80 MG/1
80 TABLET, FILM COATED ORAL NIGHTLY
Status: DISCONTINUED | OUTPATIENT
Start: 2020-03-27 | End: 2020-03-31

## 2020-03-27 RX ORDER — SIMVASTATIN 40 MG
40 TABLET ORAL NIGHTLY
Status: ON HOLD | COMMUNITY
End: 2020-03-31

## 2020-03-27 RX ORDER — FAMOTIDINE 10 MG/ML
20 INJECTION, SOLUTION INTRAVENOUS DAILY
Status: DISCONTINUED | OUTPATIENT
Start: 2020-03-27 | End: 2020-03-31

## 2020-03-27 RX ORDER — POLYETHYLENE GLYCOL 3350 17 G/17G
17 POWDER, FOR SOLUTION ORAL DAILY PRN
Status: DISCONTINUED | OUTPATIENT
Start: 2020-03-27 | End: 2020-03-31

## 2020-03-27 RX ORDER — PHENYLEPHRINE HCL IN 0.9% NACL 50MG/250ML
PLASTIC BAG, INJECTION (ML) INTRAVENOUS CONTINUOUS PRN
Status: DISCONTINUED | OUTPATIENT
Start: 2020-03-27 | End: 2020-03-27 | Stop reason: HOSPADM

## 2020-03-27 RX ORDER — METOCLOPRAMIDE HYDROCHLORIDE 5 MG/ML
10 INJECTION INTRAMUSCULAR; INTRAVENOUS EVERY 8 HOURS PRN
Status: DISCONTINUED | OUTPATIENT
Start: 2020-03-27 | End: 2020-03-31

## 2020-03-27 RX ORDER — SODIUM PHOSPHATE, DIBASIC AND SODIUM PHOSPHATE, MONOBASIC 7; 19 G/133ML; G/133ML
1 ENEMA RECTAL ONCE AS NEEDED
Status: DISCONTINUED | OUTPATIENT
Start: 2020-03-27 | End: 2020-03-31

## 2020-03-27 RX ORDER — LABETALOL HYDROCHLORIDE 5 MG/ML
10 INJECTION, SOLUTION INTRAVENOUS EVERY 10 MIN PRN
Status: DISCONTINUED | OUTPATIENT
Start: 2020-03-27 | End: 2020-03-31

## 2020-03-27 RX ORDER — ENOXAPARIN SODIUM 100 MG/ML
40 INJECTION SUBCUTANEOUS DAILY
Status: DISCONTINUED | OUTPATIENT
Start: 2020-03-27 | End: 2020-03-31

## 2020-03-27 RX ORDER — FAMOTIDINE 20 MG/1
20 TABLET ORAL DAILY
Status: DISCONTINUED | OUTPATIENT
Start: 2020-03-27 | End: 2020-03-31

## 2020-03-27 RX ORDER — SODIUM CHLORIDE 9 MG/ML
INJECTION, SOLUTION INTRAVENOUS CONTINUOUS
Status: ACTIVE | OUTPATIENT
Start: 2020-03-27 | End: 2020-03-29

## 2020-03-27 RX ORDER — ASPIRIN 300 MG
300 SUPPOSITORY, RECTAL RECTAL ONCE
Status: COMPLETED | OUTPATIENT
Start: 2020-03-27 | End: 2020-03-27

## 2020-03-27 RX ORDER — SENNOSIDES 8.6 MG
17.2 TABLET ORAL NIGHTLY
Status: DISCONTINUED | OUTPATIENT
Start: 2020-03-27 | End: 2020-03-31

## 2020-03-27 RX ORDER — ONDANSETRON 2 MG/ML
4 INJECTION INTRAMUSCULAR; INTRAVENOUS EVERY 4 HOURS PRN
Status: DISCONTINUED | OUTPATIENT
Start: 2020-03-27 | End: 2020-03-27

## 2020-03-27 RX ORDER — ASPIRIN 325 MG
325 TABLET ORAL DAILY
Status: DISCONTINUED | OUTPATIENT
Start: 2020-03-28 | End: 2020-03-31

## 2020-03-27 RX ORDER — SODIUM CHLORIDE 9 MG/ML
INJECTION, SOLUTION INTRAVENOUS CONTINUOUS
Status: ACTIVE | OUTPATIENT
Start: 2020-03-27 | End: 2020-03-27

## 2020-03-27 RX ORDER — METOPROLOL TARTRATE 5 MG/5ML
5 INJECTION INTRAVENOUS ONCE
Status: DISCONTINUED | OUTPATIENT
Start: 2020-03-27 | End: 2020-03-27

## 2020-03-27 NOTE — ED PROVIDER NOTES
Patient Seen in: [facility] Emergency Department      History   Patient presents with:  Stroke    Stated Complaint: stroke last known normal 10pm    HPI    This is a 77-year-old female who arrives here with complaints of a stroke last known about 10 Social History    Tobacco Use      Smoking status: Never Smoker      Smokeless tobacco: Never Used    Alcohol use: No      Alcohol/week: 0.0 standard drinks    Drug use:  No             Review of Systems    Positive for stated complaint: stroke la PANEL (14) - Abnormal; Notable for the following components:       Result Value    Glucose 136 (*)     BUN/CREA Ratio 21.3 (*)     Calculated Osmolality 296 (*)     Alkaline Phosphatase 54 (*)     Albumin 3.3 (*)     A/G Ratio 0.9 (*)     All other compone COMPARISON:  None. INDICATIONS:  stroke last known normal 10pm  TECHNIQUE:  Noncontrast CT scanning is performed through the brain. Dose reduction techniques were used.  Dose information is transmitted to the [facility] [employer] of Radiology) NRDR (Suleiman Patrizia Crystal MD on 3/27/2020 at 10:28 AM          Ct Cerebral Perfusion (cpt=0042t)    Result Date: 3/27/2020  PROCEDURE:  CT CEREBRAL PERFUSION (CPT=0042T)  COMPARISON:  MARKUS HERNANDEZ, CTA BRAIN + CTA CAROTIDS (KVG=92194/90641), 3/27/2020, 10:51 AM.  Jonathon Malloy 12:01 PM     Finalized by: Santiago Rivera MD on 3/27/2020 at 12:08 PM          Cta Brain + Cta Carotids (UAY=[id number])    Result Date: 3/27/2020  PROCEDURE:  CTA BRAIN + CTA CAROTIDS (OBH=93607/27928)  COMPARISON:  [facility] , CT, CT BRAIN OR HEAD (101 basilar dissection or focal high-grade stenosis. On the recent noncontrast study, a hyper attenuating focus was seen in the left sylvian fissure suspicious for thrombus at the M2 level.   This is corroborated with CTA, with truncation of contrast column findings. Patient will be admitted for further evaluation treatment. .  The patient CBC, comprehensive, alcohol was negative. Ammonia was negative. Patient was given IV fluids patient was given rectal aspirin.   She did have a little bit of a cough but no

## 2020-03-27 NOTE — PLAN OF CARE
Problem: Impaired Swallowing  Goal: Minimize aspiration risk  Description  Interventions:  - Patient should be alert and upright for all feedings (90 degrees preferred)  - Offer food and liquids at a slow rate  - No straws  - Encourage small bites of delroy

## 2020-03-27 NOTE — SLP NOTE
ADULT SWALLOWING EVALUATION    ASSESSMENT    ASSESSMENT/OVERALL IMPRESSION:  Pt seen at bedside this PM for bedside swallow evaluation. Pt admitted to hospital due to c/o right sided weakness and aphasia. Speech consulted per CVA protocol.  CTA + left M2 oc Administration Recommendations: Whole in puree  Treatment Plan/Recommendations: Videofluoroscopic swallow study;Communication evaluation  Discharge Recommendations/Plan: Undetermined    HISTORY   MEDICAL HISTORY  Reason for Referral: Stroke protocol;RN dys volume is 82 ml from the T-max greater than 6 seconds.     OBJECTIVE   ORAL MOTOR EXAMINATION  Dentition: Natural;Functional  Symmetry: Unable to assess  Strength: Unable to assess  Tone: Unable to assess  Range of Motion: Unable to assess  Rate of Motion:

## 2020-03-27 NOTE — PROGRESS NOTES
NURSING ADMISSION NOTE    Received report from ED RN. Patient alert, but disoriented x4. No visible signs of pain. Admission navigator completed. During NIH assessment, patient unable to follow commands.  Spoke with Lashon Ennis, Stroke APN, concerning how to

## 2020-03-27 NOTE — PROGRESS NOTES
Discussed case with Oz Doyle and Juan Carlos Larios, and followed patient thru rest of CT imaging. At conclusion of CT, patient continued to have expressive aphasia. When asked her name, she replies with random words.   Continues to intermittently follow commands

## 2020-03-27 NOTE — ED INITIAL ASSESSMENT (HPI)
Per medics, patient's last known normal is 2200 yesterday. Woke up this morning with expressive aphasia and medics give score of 4 on NIH scale. Patient is alert and answers questions such as her name with words that do not make sense.  Patient has stro

## 2020-03-27 NOTE — TELEPHONE ENCOUNTER
called to advise Pt was taken by paramedics to [facility].   He said she was \"incoherent\"

## 2020-03-27 NOTE — CONSULTS
Short FORTINO Consult:     Discussed patient and reviewed imaging with Dr. Emily Glasgow, Dr. Lidia Medellin, and [facility] (stroke coordinator).   Due to distal M2 thrombus, borderline NIH, 50 percent penumbra, and timeline, patient does not meet criteria for neuro-interven

## 2020-03-27 NOTE — CONSULTS
[facility]  Neuro Critical Care Consult Note    Alejandro Ernst Patient Status:  Emergency    1945 MRN [id number]   Location [address] [facility] Attending Jose Winn MD   Hosp Day # 0 PCP Catalino Lemus DO     Date of History:   reports that she has never smoked. She has never used smokeless tobacco. She reports that she does not drink alcohol or use drugs.       Family History:  Family History   Problem Relation Age of Onset   • High Cholesterol Father    • Cancer Fathe non-tender, non-distended, no masses, no guarding, no rebound, positive BS  Extremity: No edema, no cyanosis  Skin: No skin breakdown noted on exam    Neurological:   MS:The patient is alert aphasic does not follow commands  CrN: PERRLA +3 brisk. EOMI.  VFF 100.5°F.    Skin:  -Monitor for skin breakdown. Endocrine:  -Hyperglycemia protocol. A total of 35 minutes of critical care time (exclusive of billable procedures) was administered to manage and/or prevent neurologic instability.  This involved dire

## 2020-03-27 NOTE — H&P
[facility]  History and Physical     Bianka Pallas Patient Status:  Emergency    1945 MRN [id number]   Location [address] [facility] Attending Edward Naqvi MD   Hosp Day # 0 PCP Julio Cesar Johnson DO     Chief Complain cyst   • Multinodular goiter 7/1/2011    multinodular goiter/thyroid cyst    • Obese 2006   • Obesity    • Osteoarthritis     L knee   • Osteoarthrosis, unspecified whether generalized or localized, lower leg 6/20/2013    Specify: LT Log Date: 07/26/2011 aspirin 81 MG Oral Tab, Take 81 mg by mouth daily. , Disp: , Rfl:   MetroNIDAZOLE 0.75 % External Gel, Apply to acne prone areas qAM, Disp: 45 g, Rfl: PRN        Review of Systems:   A comprehensive 14 point review of systems was completed.     Pertinent p consulted  3. Ischemic CVA order set  4. Lipid panel/A1C  5. ASA/STATIN  6. ECHO  7. PT/OT/Speech  8. NPO for now  2. Dyslipidemia  3.  Primary essential HTN    Quality:  · DVT Prophylaxis: Lovenox  · CODE status: Full  · [place]: no    Plan of care discussed

## 2020-03-28 ENCOUNTER — APPOINTMENT (OUTPATIENT)
Dept: GENERAL RADIOLOGY | Facility: HOSPITAL | Age: 75
DRG: 065 | End: 2020-03-28
Attending: INTERNAL MEDICINE
Payer: MEDICARE

## 2020-03-28 PROCEDURE — 99232 SBSQ HOSP IP/OBS MODERATE 35: CPT | Performed by: PHYSICIAN ASSISTANT

## 2020-03-28 PROCEDURE — 99232 SBSQ HOSP IP/OBS MODERATE 35: CPT | Performed by: INTERNAL MEDICINE

## 2020-03-28 PROCEDURE — 74230 X-RAY XM SWLNG FUNCJ C+: CPT | Performed by: INTERNAL MEDICINE

## 2020-03-28 RX ORDER — POTASSIUM CHLORIDE 20 MEQ/1
40 TABLET, EXTENDED RELEASE ORAL EVERY 4 HOURS
Status: COMPLETED | OUTPATIENT
Start: 2020-03-28 | End: 2020-03-28

## 2020-03-28 NOTE — VIDEO SWALLOW STUDY NOTE
ADULT VIDEOFLUOROSCOPIC SWALLOWING STUDY    Admission Date: 3/27/2020  Evaluation Date: 03/28/20  Radiologist: Dr. Beach Rape: Regular  Diet Recommendations - Liquid:  Thin    Further Follow-up:  Follow Up Need limits. Mild blunting left costophrenic angle. No pneumothorax.       Reason for Referral: Stroke protocol;RN dysphagia screen      Family/Patient Goals:  Pt unable to state due to aphasia     ASSESSMENT   DYSPHAGIA ASSESSMENT  Test completed in con Communication eval   New goal     PLAN: Communication eval and treat    EDUCATION/INSTRUCTION  Reviewed results and recommendations with patient/family/caregiver.   Agreement/Understanding verbalized and all questions answered to their apparent satisfaction

## 2020-03-28 NOTE — OCCUPATIONAL THERAPY NOTE
Attempted to see Pt for OT eval. Pt is on bedrest. Will re-attempt when appropriate as schedule allows.

## 2020-03-28 NOTE — PHYSICAL THERAPY NOTE
PHYSICAL THERAPY EVALUATION - INPATIENT     Room Number: 8605/5997-W  Evaluation Date: 3/28/2020  Type of Evaluation: Initial  Physician Order: PT Eval and Treat    Presenting Problem: AMS, confused  Reason for Therapy: Mobility Dysfunction and Disch with ambulation. Pt poor historian due to aphasia, but said yes when asked if has a RW and if uses a RW. SUBJECTIVE  Pt doing 2 thumbs up after PT introduced self. Patient self-stated goal is not stated at this time.      OBJECTIVE  Precautions: Bed a bed to a chair (including a wheelchair)?: A Little   -   Need to walk in hospital room?: A Little   -   Climbing 3-5 steps with a railing?: A Little       AM-PAC Score:  Raw Score: 21   Approx Degree of Impairment: 28.97%   Standardized Score (AM-PAC Sca impacting therapy include HTN, HLD, thyroid nodule. In this PT evaluation, the patient presents with the following impairments aphasia, difficulty following commands and decreased safety awareness.   Functional outcome measures completed include AM PAC wit

## 2020-03-28 NOTE — PROGRESS NOTES
[id number] Adrian[facility] Salc[facility] Neu[facility] Progress Note    Filipe Hall Patient Status:  Inpatient    1945 MRN [id number]   [facility] 7NE-A Attending Mesha Rodríguez DO   Hosp Day # 1 PCP Madina Morgan DO       HISTORY OF PRESENT ILLNESS: Bee Venom                   Comment:\"bee sting\"    MEDICATIONS:  No current outpatient medications on file.     Potassium Chloride ER (K-DUR M20) CR tab 40 mEq, 40 mEq, Oral, Q4H  0.9% NaCl infusion, , Intravenous, Continuous  acetaminophen (TYLENOL) tab Exam limited due to pt aphasia.   Mental status: Oriented to person  Speech: global aphasia, appears expressive > receptive but both are impaired, no dysarthria  Memory and comprehension: has difficulty following commands, can mimic some commands  Cranial N Cardiology consulted to work up for embolic source (M2 occlusion, carotids no significant plaque or stenosis, may need KAY or event monitor)    Reviewed imaging and plan with Dr. Yovani Archuleta. Placed call to  and relayed plan.       KAYLA Miner

## 2020-03-28 NOTE — PLAN OF CARE
Assumed care of pt. At 1900  Pt.  Alert and awake  Neuro checks q4hr  Global aphasia  Able to follow a few commands but not all  Motor function 5/5  NSR on telemetry  Radial pulses 3/3 Pedals 2/2  SBP goal 100-180  Clear lungs room air  Abdomen soft round n

## 2020-03-28 NOTE — PROGRESS NOTES
[facility] HOSPITALIST  Progress Note     Lotus Bridges Patient Status:  Inpatient    1945 MRN [id number]   [facility] 7NE-A Attending Monet Underwood DO   Hosp Day # 1 PCP Stacia Cage DO     Chief Complaint: Aphasia    S: Patient re Imaging: Imaging data reviewed in Epic.     Medications:   • atorvastatin  80 mg Oral Nightly   • Senna  17.2 mg Oral Nightly   • docusate sodium  100 mg Oral BID   • famoTIDine  20 mg Oral Daily    Or   • famoTIDine  20 mg Intravenous Daily   • aspirin

## 2020-03-29 PROCEDURE — 99231 SBSQ HOSP IP/OBS SF/LOW 25: CPT | Performed by: PHYSICIAN ASSISTANT

## 2020-03-29 PROCEDURE — 99232 SBSQ HOSP IP/OBS MODERATE 35: CPT | Performed by: INTERNAL MEDICINE

## 2020-03-29 NOTE — CONSULTS
[facility] Cardiology Consultation Preston Tanner MD    The patient was interviewed, examined, the chart was reviewed and the consult was dictated. This is a 76year old female with a chief complaint of CVA.   We are asked to see the patient to evaluate possible

## 2020-03-29 NOTE — HOME CARE LIAISON
Received referral from [place], [place]. Called patient's  to discuss home health services and offer choice.  very concerned about patient's current state and wanted updates.  I informed the patinet's  that he would have to call the bedside n

## 2020-03-29 NOTE — PROGRESS NOTES
[id number] Adrian[facility] Salcido Neurology Progress Note    Lotus Weros Patient Status:  Inpatient    1945 MRN [id number]   [facility] 7NE-A Attending Monet Underwood DO   Hosp Day # 2 PCP Stacia Cage DO       HISTORY OF PRESENT ILLNES ALLERGIES:    Bee Venom                   Comment:\"bee sting\"    MEDICATIONS:  No current outpatient medications on file.     0.9% NaCl infusion, , Intravenous, Continuous  acetaminophen (TYLENOL) tab 650 mg, 650 mg, Oral, Q4H PRN    Or  acetaminophen ( Speech: global aphasia, appears expressive > receptive but both are significantly impaired, no dysarthria  Memory and comprehension: has difficulty following commands, can mimic some commands  Cranial Nerves: PERRL 3mm brisk, EOMI, no nystagmus, facial sen Cardiology consulted to work up for embolic source (M2 occlusion, carotids no significant plaque or stenosis, may need KAY or event monitor)    Discussed plan with Dr. Damion Henriquez. Possible d/c on 3/30 depending on Cardiology recs and clinical course.       Pl

## 2020-03-29 NOTE — PLAN OF CARE
Assumed care of pt. At [address] aphasia  Motor all extremities 5/5  PERRLA  Clear/diminished lungs- room air- occasional non-productive cough  NSR on telemetry  SBP goal 100-180  Abdomen soft round non-tender  No c/o pain  Standby assist- pt.  Ca

## 2020-03-29 NOTE — PLAN OF CARE
Assumed care at 730  Patient alert  Neuro checks done Q4H  Global aphasia noted  No other deficits noted  Follows some commands  Impulsive at times  Bed alarm and chair alarm in place  IVF infusing per order  Up standby assist tolerating well   upda

## 2020-03-29 NOTE — PROGRESS NOTES
DA[facility] HOSPITALIST  Progress Note     Andres Boxer Patient Status:  Inpatient    1945 MRN [id number]   [facility] 7NE-A Attending Lee Raymundo DO   Hosp Day # 2 PCP Cassandra Daniel DO     Chief Complaint: Aphasia    S: patient st on SCr of 0.7 mg/dL). No results for input(s): PTP, INR in the last 168 hours. No results for input(s): TROP, CK in the last 168 hours. Imaging: Imaging data reviewed in Epic.     Medications:   • atorvastatin  80 mg Oral Nightly   • Senna  17

## 2020-03-29 NOTE — PHYSICAL THERAPY NOTE
PHYSICAL THERAPY TREATMENT NOTE - INPATIENT    Room Number: 4421/6879-J     Session: 1   Number of Visits to Meet Established Goals: 5    Presenting Problem: AMS, confused   77 yo female with AMS found to have L MCA tempora, parietal stroke.     Problem Jacob Expose Left leg  BP Method: Automatic  Patient Position: Sitting    O2 WALK     SPO2 Ambulation on Room Air: 96              AM-PAC '6-Clicks' INPATIENT SHORT FORM - BASIC MOBILITY  How much difficulty does the patient currently have. ..  -   Turning over in bed ( addressed; Alarm set    ASSESSMENT   Pt progressing as demonstrated by increased indep with gait and standing tasks, able to ambulate near community speeds and negotiate stairs without physical assist.  Short form DGI indicative of decreased fall risk.   Pt

## 2020-03-29 NOTE — PLAN OF CARE
Assumed pt care at 0730  VSS- Neuros Q4  Up x1 and walker   Global aphasia, pt able to answer certain questions  Pt denies any pain  Pt up in chair w/PT  Multiple calls between spouse updating POC  POC dicussed with pt, spouse and daughter  Will continue t Swallowing  Goal: Minimize aspiration risk  Description  Interventions:  - Patient should be alert and upright for all feedings (90 degrees preferred)  - Offer food and liquids at a slow rate  - No straws  - Encourage small bites of food and small sips of

## 2020-03-29 NOTE — OCCUPATIONAL THERAPY NOTE
OCCUPATIONAL THERAPY EVALUATION - INPATIENT     Room Number: 5405/3979-V  Evaluation Date: 3/29/2020  Type of Evaluation: Initial  Presenting Problem: CVA    Physician Order: IP Consult to Occupational Therapy  Reason for Therapy: ADL/IADL Dysfunction and risk    WEIGHT BEARING RESTRICTION  Weight Bearing Restriction: None                PAIN ASSESSMENT  Ratin  Location: denies       COGNITION  Attention Span:  appears intact  Orientation Level:  oriented to place, oriented to time and oriented to perso 21  Approx Degree of Impairment: 32.79%  Standardized Score (AM-PAC Scale): 44.27  CMS Modifier (G-Code): CJ    FUNCTIONAL TRANSFER ASSESSMENT  Supine to Sit : Modified independent  Sit to Stand: Supervision    Skilled Therapy Provided: Patient educated on safe to be left alone d/t poor decision making, poor problem solving and decreased ability to communicate needs. Patient might not be able to activate emergency system if needed. Patient has decreased ability to recognize hazards in environment.  Patient wi

## 2020-03-30 ENCOUNTER — APPOINTMENT (OUTPATIENT)
Dept: CT IMAGING | Facility: HOSPITAL | Age: 75
DRG: 065 | End: 2020-03-30
Attending: PHYSICIAN ASSISTANT
Payer: MEDICARE

## 2020-03-30 ENCOUNTER — TELEPHONE (OUTPATIENT)
Dept: FAMILY MEDICINE CLINIC | Facility: CLINIC | Age: 75
End: 2020-03-30

## 2020-03-30 PROCEDURE — 99233 SBSQ HOSP IP/OBS HIGH 50: CPT | Performed by: OTHER

## 2020-03-30 PROCEDURE — 90792 PSYCH DIAG EVAL W/MED SRVCS: CPT | Performed by: OTHER

## 2020-03-30 PROCEDURE — 99232 SBSQ HOSP IP/OBS MODERATE 35: CPT | Performed by: INTERNAL MEDICINE

## 2020-03-30 PROCEDURE — 70450 CT HEAD/BRAIN W/O DYE: CPT | Performed by: PHYSICIAN ASSISTANT

## 2020-03-30 RX ORDER — LORAZEPAM 2 MG/ML
1 INJECTION INTRAMUSCULAR ONCE
Status: COMPLETED | OUTPATIENT
Start: 2020-03-30 | End: 2020-03-30

## 2020-03-30 RX ORDER — LORAZEPAM 2 MG/ML
INJECTION INTRAMUSCULAR
Status: COMPLETED
Start: 2020-03-30 | End: 2020-03-30

## 2020-03-30 RX ORDER — HALOPERIDOL 5 MG/ML
2 INJECTION INTRAMUSCULAR ONCE
Status: COMPLETED | OUTPATIENT
Start: 2020-03-30 | End: 2020-03-30

## 2020-03-30 RX ORDER — HALOPERIDOL 5 MG/ML
INJECTION INTRAMUSCULAR
Status: COMPLETED
Start: 2020-03-30 | End: 2020-03-30

## 2020-03-30 NOTE — CM/SW NOTE
Because pt does not have a PT need she does not qualify for inpt acute rehab, per Dr Heavenly Angel. She is recommending outpt OT/ST. Explained this to pt's family who had a lot of questions. Gave them the phone number for  Outpt OT/ST [phone].   They wi

## 2020-03-30 NOTE — PAYOR COMM NOTE
--------------  ADMISSION REVIEW     Catina Israel MA [facility]  Subscriber #:  [id number]  Authorization Number: [id number]    Admit date: 3/27/20  Admit time: 1359       Admitting Physician: Evan Nevarez MD  Attending Physician:  Mike Mclean DO  Primary Car • Multinodular goiter 7/1/2011    multinodular goiter/thyroid cyst    •     •     • Osteoarthritis     L knee   • Osteoarthrosis, unspecified whether generalized or localized, lower leg 6/20/2013    Specify: LT Log Date: 07/26/2011    • Tendinitis    • Thy LUNGS: Clear to auscultation, there is no wheezing or retraction. No crackles. CV: Cardiovascular is regular without murmurs or rubs. ABD: The abdomen is soft nondistended nontender. There is no rebound. There is no guarding.     EXT: There is good The patient was placed on monitors, IV was started, blood was drawn. MDM     The EKG shows normal sinus rhythm. There is no acute ST elevations or ischemic findings.   The rest of the EKG including rate rhythm axis and intervals I agree with t PROCEDURE:  CT BRAIN OR HEAD (68135)  COMPARISON:  None. INDICATIONS:  stroke last known normal 10pm  TECHNIQUE:  Noncontrast CT scanning is performed through the brain. Dose reduction techniques were used.  Dose information is transmitted to the [facility] PROCEDURE:  XR CHEST AP PORTABLE  (CPT=71045)  TECHNIQUE:  AP chest radiograph was obtained. COMPARISON:  None.   INDICATIONS:  stroke last known normal 10pm  PATIENT STATED HISTORY: (As transcribed by Technologist)  Patient offered no additional history a PROCEDURE:  CT CEREBRAL PERFUSION (CPT=0042T)  COMPARISON:  [facility] , CT, CTA BRAIN + CTA CAROTIDS (WWR=74276/52766), 3/27/2020, 10:51 AM.  INDICATIONS:  stroke last known normal 10pm  TECHNIQUE:  Multiple axial sections were obtained per stroke protocol.  A Cta Brain + Cta Carotids (ZKB=63070/47047)    Result Date: 3/27/2020 PROCEDURE:  CTA BRAIN + CTA CAROTIDS (MBT=89198/55461)  COMPARISON:  [facility] , CT, CT BRAIN OR HEAD (41109), 3/27/2020, 9:38 AM.  INDICATIONS:  stroke last known normal 10pm  TECHNIQUE:  CT angiography of the head and neck were obtained with non-ionic contr suspicious for thrombus at the M2 level. This is corroborated with CTA, with truncation of contrast column and diminution of more distal branch opacification.     The anterior cerebral arteries, right middle cerebral artery, and posterior cerebral arteries Jonas this case with Dr. Vanessa Dhaliwal originally. Subsequent discussed this case with Dr. Contreras Sutherland. He did want a perfusion study. The stroke APN did see the patient and did get an NIH score of 5.   The patient does not strictly meet criteria for Angela on the borde No follow-up provider specified. Medications Prescribed:  Current Discharge Medication List                   Present on Admission  Date Reviewed: 3/3/2020          ICD-10-CM Noted POA    Altered mental status R41.82 3/27/2020 Unknown    Azotemia R79. History is provided by her  as patient is suffering from both receptive and expressive aphasia and is not able to properly provide her own medical history.   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 Alert and oriented x 3. HEENT: Normocephalic atraumatic. Moist mucous membranes. EOM-I. PERRLA.  Anict 3/27/2020                        Electronically signed by Monet Underwood DO on 3/27/2020  2:18 PM         MEDICATIONS ADMINISTERED IN LAST 1 DAY:  atorvastatin (LIPITOR) tab 80 mg     Date Action Dose Route User    3/29/2020 2030 Given 80 mg Oral Merly Mcintosh • Multinodular goiter 7/1/2011     multinodular goiter/thyroid cyst    •     •      • Osteoarthritis       L knee   • Osteoarthrosis, unspecified whether generalized or localized, lower leg 6/20/2013     Specify: LT Log Date: 07/26/2011    • Tendinitis   aspirin 81 MG Oral Tab, Take 81 mg by mouth daily. , Disp: , Rfl:             Current Medications:  No current facility-administered medications for this encounter.      REVIEW OF SYSTEMS:   Patient is aphasic     OBJECTIVE:  Temp:  [97.3 °F (36.3 °C)] 97. 3 Active Problems:    Altered mental status        Neuro:  -M2 left occlusion. Perfusion is pending at this time. Likely will go for thrombectomy. Dr. Rocio Andinored aware  -Admit to ICU after if intervention warranted  -Every hour neuro checks.   Repeat stat CT head Discussed patient and reviewed imaging with Dr. Savita Taylor, Dr. Meceh Stinson, and [facility] APN (stroke coordinator).   Due to distal M2 thrombus, borderline NIH, 50 percent penumbra, and timeline, patient does not meet criteria for neuro-intervention per STEPHANE borges pr HISTORY OF PRESENT ILLNESS:  Pleasant 49-year-old female who was admitted several days ago with expressive and receptive aphasia, right-sided weakness. Patient is found to have a middle cerebral artery occlusion on the left side.   Patient does not reach ASSESSMENT AND PLAN:  Cerebrovascular accident. Rule out cardioembolic source of embolism. Will have patient wear 30-day event monitor at discharge. Will discuss benefits of KAY.   Further orders to follow.     Dictated By Jaziel Rascon M.D.  d:     77

## 2020-03-30 NOTE — CM/SW NOTE
PT/OT are now changing their recommendation from day rehab to inpt acute rehab because pt has severe aphasia.   Referral sent to [facility] for PMR eval.  SW to f/u with pt's d/c plan pending PMR eval.

## 2020-03-30 NOTE — SLP NOTE
SPEECH/LANGUAGE/COGNITIVE EVALUATION - INPATIENT    Admission Date: 3/27/2020  Evaluation Date: 03/30/20    Reason for Referral: Stroke protocol;RN dysphagia screen    ASSESSMENT & PLAN   ASSESSMENT & IMPRESSION  Pt seen this AM for communication evaluatio assess cognition once pt's communication abilities improve. Given pt's inability to communicate and comprehend language, pt would benefit from acute rehab.  Pt's rehab potential judged to be good given pt able to appropriately participate in intense aphasia Plan/Recommendations: Aphasia therapy  Number of Visits to Meet Established Goals: 5  Follow Up Needed: Yes  SLP Follow-up Date: 03/31/20    Thank you for your referral.  If you have any questions please contact LIZA Harley

## 2020-03-30 NOTE — OCCUPATIONAL THERAPY NOTE
OCCUPATIONAL THERAPY TREATMENT NOTE - INPATIENT     Room Number: 9828/6834-L  Session: 1   Number of Visits to Meet Established Goals: 2    Presenting Problem: CVA    History related to current admission: Patient is a 77y/o female admitted 3/27/20 from [place] clothing?: A Little  -   Bathing (including washing, rinsing, drying)?: A Little  -   Toileting, which includes using toilet, bedpan or urinal? : A Little  -   Putting on and taking off regular upper body clothing?: A Little  -   Taking care of personal gr decreased strength, cognition, following, home safety, safety awareness. These deficits continue manifest functionally while performing mobility, self-care, home safety, community re-intregation, IADLs.    The patient is below baseline and would benefit fr

## 2020-03-30 NOTE — PROGRESS NOTES
DA[facility] HOSPITALIST  Progress Note     Iainmaddy Larioss Patient Status:  Inpatient    1945 MRN [id number]   [facility] 7NE-A Attending Danisha Craig DO   Hosp Day # 3 PCP Chris Lopez DO     Chief Complaint: Aphasia    S: patient ag 53.2 mL/min (based on SCr of 0.7 mg/dL). No results for input(s): PTP, INR in the last 168 hours. No results for input(s): TROP, CK in the last 168 hours. Imaging: Imaging data reviewed in Epic.     Medications:   • atorvastatin  80 mg Oral Ni

## 2020-03-30 NOTE — PLAN OF CARE
Problem: Impaired Communication  Goal: Patient will achieve maximal communication potential  Description  Interventions:  Strategies for Communication:  1. Make sure you have the person's attention before you start.   2. Minimize or eliminate background n

## 2020-03-30 NOTE — PHYSICAL THERAPY NOTE
PHYSICAL THERAPY TREATMENT NOTE - INPATIENT    Room Number: 5566/7930-Q     Session: 2  Number of Visits to Meet Established Goals: 5    Presenting Problem: AMS, confused   75 yo female with AMS found to have L MCA tempora, parietal stroke.     Problem Lis MOBILITY  How much difficulty does the patient currently have. ..  -   Turning over in bed (including adjusting bedclothes, sheets and blankets)?: None   -   Sitting down on and standing up from a chair with arms (e.g., wheelchair, bedside commode, etc.): N unsupported one foot in front                                                 Didn't understand instructions  Standing on one leg                                                                              Didn't understand instructions    Total score Max

## 2020-03-30 NOTE — CONSULTS
[facility] [place]    PATIENT'S NAME: Angelo Fuentes   ATTENDING PHYSICIAN: Jsamin Morrison DO   CONSULTING PHYSICIAN: Gaston Singer M.D.    PATIENT ACCOUNT#:   [de-identified]    LOCATION:  [address] [place] [address]  MEDICAL RECORD #:   [id number]       DATE OF BIRTH: Healthy-appearing female in no acute distress. VITAL SIGNS:  Blood pressure 120/68, pulse 85 and regular. She is afebrile. HEENT:  Head normocephalic. Anicteric sclerae. NECK:  Supple. No thyromegaly or adenopathy. LUNGS:  Clear.   HEART:  Tones

## 2020-03-30 NOTE — TELEPHONE ENCOUNTER
[facility]?

## 2020-03-30 NOTE — CONSULTS
[facility] Patient Status:  Inpatient    1945 MRN [id number]   [facility] [place] 7NE-A Attending Brigido Steinberg DO   Hosp Day # 3 PCP Jeaneth Cole DO     Patient Identification  Kam Edmond is a 76year old no right to left     atrial level shunt. 3. Aortic valve: Trileaflet. No aortic stenosis. No aortic regurgitation. 4. Right ventricle: Normal right ventricle size and systolic function.     Impressions:  No previous study was available for comparison. mL, Intravenous, Once  [] 0.9% NaCl infusion, , Intravenous, Continuous  [COMPLETED] iohexol (OMNIPAQUE) 350 MG/ML injection 100 mL, 100 mL, Intravenous, ONCE PRN  [COMPLETED] iohexol (OMNIPAQUE) 350 MG/ML injection 100 mL, 100 mL, Intravenous, ONCE Other         ovarian ca   • Other (Leukemia) Other         aunt   • Breast Cancer Other         aunt   • Heart Disorder Maternal Grandmother    • Obesity Brother        Allergies:    Bee Venom                   Comment:\"bee sting\"        Lab Results   C patella and achilles throughout. Babinski negative bilaterally. Santacruz's negative bilaterally. Finger to nose and heel to shin testing is normal bilaterally. Psychiatric: Awake, alert and oriented x 2.

## 2020-03-30 NOTE — PROGRESS NOTES
SHANA Short Note:    Received call from RN. Report that patient has become \"agitated,\" put on her clothes and walked out of the hospital room, and refused to re-enter. Security and Code Sruthi Murray were called due to patient non-cooperation.   Dr. Anai Rucker was prese

## 2020-03-30 NOTE — CONSULTS
[facility]  Report of Psychiatric Consultation    Filipe Hall Patient Status:  Inpatient    1945 MRN [id number]   [facility] 7NE-A Attending Mesha Rodríguez DO   Hosp Day # 3 PCP Madina Morgan DO     Date of Admission: 3/27/ RENE APOORVA was called this AM because she put on her clothes and walked out into the hallway. She was trying to leave the hospital. Staff got the  and son on the phone to see if they could convince her to go back into her room, but it did not work.  She aunt   • Breast Cancer Other         aunt   • Heart Disorder Maternal Grandmother    • Obesity Brother       reports that she has never smoked. She has never used smokeless tobacco. She reports that she does not drink alcohol or use drugs.     Amy BP: 137/61   Pulse:    Resp: 18   Temp: 99.2 °F (37.3 °C)     Appearance: fair grooming  Behavior: uncooperative, she walked out into the hallway and wouldn't go back into her room  Speech: expressive aphasia, talk in phrases that were out of context and i

## 2020-03-30 NOTE — PROGRESS NOTES
[facility] Cardiology Progress Note - Jayme Ball Patient Status:  Inpatient    1945 MRN [id number]   [facility] 7NE-A Attending Brigido Steinberg DO   Hosp Day # 3 PCP Jeaneth Cole DO     Subjective:  Mildly rhonchi or dullness. Normal excursions and effort. Abdomen: Soft, non-tender. No organosplenomegally, mass or rebound, BS-present. Extremities: Without clubbing, cyanosis or edema. Peripheral pulses are 2+.   Neurologic: Alert and oriented, normal affec Daily  aspirin 300 MG rectal suppository 300 mg, 300 mg, Rectal, Daily    Or  aspirin tab 325 mg, 325 mg, Oral, Daily  Enoxaparin Sodium (LOVENOX) 40 MG/0.4ML injection 40 mg, 40 mg, Subcutaneous, Daily        ROS:  General Health: otherwise feels well, we

## 2020-03-30 NOTE — PLAN OF CARE
JOHN orientation due to aphasia   Alert, will follow some commands at times  Impulsive, fall precautions in place   RA, VSS, NSR per Tele  Denies pain at this time  Needs attended to, Will cont to monitor.       Problem: NEUROLOGICAL - ADULT  Goal: Achieves

## 2020-03-30 NOTE — PROGRESS NOTES
[id number] Adrianne Salcido Neurology Progress Note    Andres Boxer Patient Status:  Inpatient    1945 MRN [id number]   [facility] 7NE-A Attending Lee Raymundo, DO   Hosp Day # 3 PCP Cassandra Daniel, [address] [place] [address] docusate sodium (COLACE) cap 100 mg, 100 mg, Oral, BID  PEG 3350 (MIRALAX) powder packet 17 g, 17 g, Oral, Daily PRN  magnesium hydroxide (MILK OF MAGNESIA) 400 MG/5ML suspension 30 mL, 30 mL, Oral, Daily PRN  bisacodyl (DULCOLAX) rectal suppository 10 mg, Imaging/Diagnostics: no new neuro studies  MRI Brain 3/27/20:  CONCLUSION:  Sizable infarct involving mid-posterior left MCA distribution, portion of left temporal lobe and left parietal, without signs of hemorrhagic transformation, significant midline benita English

## 2020-03-30 NOTE — PLAN OF CARE
Patient became agitated, pulled out IV and took off tele. Patient's belongings were brought in from patient's family to have clothing upon discharge. Patient took this clothing and changed out of her gown.  Patient then attempted to leave the hospital. Wendy Castro

## 2020-03-30 NOTE — PLAN OF CARE
Assumed care of patient at 0700  Patient refusing to participate in NIH assessment, neuro assessment and refusing medication  Patient still having global aphasia, mostly says \"This\" \"That\" and \"Them\".   Dr. Babs Alexander spoke with patient to encourage her in tolerated activity level and precautions  - Ambulate with staff and RW, perform LE exercises and up in chair for meals     Outcome: Progressing

## 2020-03-31 VITALS
WEIGHT: 156.5 LBS | HEART RATE: 93 BPM | OXYGEN SATURATION: 95 % | RESPIRATION RATE: 20 BRPM | TEMPERATURE: 98 F | SYSTOLIC BLOOD PRESSURE: 144 MMHG | BODY MASS INDEX: 30 KG/M2 | DIASTOLIC BLOOD PRESSURE: 67 MMHG

## 2020-03-31 PROCEDURE — 95819 EEG AWAKE AND ASLEEP: CPT | Performed by: OTHER

## 2020-03-31 PROCEDURE — 99239 HOSP IP/OBS DSCHRG MGMT >30: CPT | Performed by: INTERNAL MEDICINE

## 2020-03-31 PROCEDURE — 99233 SBSQ HOSP IP/OBS HIGH 50: CPT | Performed by: OTHER

## 2020-03-31 RX ORDER — LISINOPRIL 10 MG/1
10 TABLET ORAL DAILY
Status: DISCONTINUED | OUTPATIENT
Start: 2020-03-31 | End: 2020-03-31

## 2020-03-31 RX ORDER — ATORVASTATIN CALCIUM 80 MG/1
80 TABLET, FILM COATED ORAL NIGHTLY
Qty: 30 TABLET | Refills: 1 | Status: SHIPPED | OUTPATIENT
Start: 2020-03-31 | End: 2020-04-20

## 2020-03-31 RX ORDER — ASPIRIN 325 MG
325 TABLET ORAL DAILY
Qty: 30 TABLET | Refills: 1 | Status: SHIPPED | OUTPATIENT
Start: 2020-04-01 | End: 2020-04-20

## 2020-03-31 RX ORDER — FAMOTIDINE 20 MG/1
20 TABLET ORAL 2 TIMES DAILY
Qty: 60 TABLET | Refills: 0 | Status: SHIPPED | OUTPATIENT
Start: 2020-03-31 | End: 2020-04-06 | Stop reason: CLARIF

## 2020-03-31 NOTE — PROGRESS NOTES
[facility] Cardiology Progress Note - Erich Ledbetter Patient Status:  Inpatient    1945 MRN [id number]   [facility] 7NE-A Attending Bernice Chacon MD   [facility] Day # 4 PCP Veena Field DO     Subjective:  Agitate and oriented, normal affect. No motor or coordinational deficit. Skin: Warm and dry.      Laboratory/Data:    Labs:         Recent Labs   Lab 03/27/20  0930 03/28/20  0558 03/29/20  0557   WBC 6.6 8.0  --    HGB 13.4 12.1  --    MCV 88.8 88.0  --    PLT 25 otherwise feels well, weight stable  Constitutiona: no recent fevers  Skin: denies any unusual skin lesions or rashes  Eyes: no visual complaints or deficits  HEENT: denies nasal congestion, sinus pain; hearing loss negative  Respiratory: denies shortness

## 2020-03-31 NOTE — PHYSICAL THERAPY NOTE
PHYSICAL THERAPY TREATMENT NOTE - INPATIENT    Room Number: 4076/0571-W     Session: 3  Number of Visits to Meet Established Goals: 5    Presenting Problem: AMS, confused   77 yo female with AMS found to have L MCA tempora, parietal stroke.     Problem Lis INPATIENT SHORT FORM - BASIC MOBILITY  How much difficulty does the patient currently have. ..  -   Turning over in bed (including adjusting bedclothes, sheets and blankets)?: None   -   Sitting down on and standing up from a chair with arms (e.g., wheelcha education;Gait training;Range of motion;Strengthening;Stair training;Transfer training;Balance training  Rehab Potential : Good  Frequency (Obs): 3-5x/week       CURRENT GOALS     Goal #1 Patient is able to demonstrate supine - sit EOB @ level: modified in

## 2020-03-31 NOTE — PLAN OF CARE
Assumed care @ 1900. Unable to assess orientation but follows command. On telemetry monitoring. Denies SOB, Denies any pain. Updated patient with plan of care, verbalizes understanding. Ensures patient is safe at all times.   Maintained a calm and safe from self-harm  Description  INTERVENTIONS:  - Apply the least restrictive restraint to prevent harm  - Notify patient and family of reasons restraints applied  - Assess for any contributing factors to confusion (electrolyte disturbances, delirium, medicat

## 2020-03-31 NOTE — CM/SW NOTE
Both [facility] and [place] accepted pt for [employer]. However, pt's  has decided that they want to take pt home and have her go to Outpt OT/ST at [facility].  Dr Ga Reagan to write an order to pt to go to Outpt OT/ST.   Emailed  caregiver resource

## 2020-03-31 NOTE — PROCEDURES
ELECTROENCEPHALOGRAM REPORT      Patient Name: Kyleigh Diego  Chart ID: [id number]  Ordering Physician: Dr Alistair Medrano Date of Test: 3/31/2020  Referring Physician:   Patient Diagnosis: AMS    HISTORY  Patient is a 76year old female admitted with profoun

## 2020-03-31 NOTE — PROGRESS NOTES
[id number] Neurology Progress Note    Caio Miles Patient Status:  Inpatient    1945 MRN [id number]   [facility] 7NE-A Attending Rhina Barros MD   [facility] Day # 4 PCP Reva Paz, [address]      Neurolog membranes moist  Skin: warm,dry    Neurologic: exam limited d/t her aphasia   Following some commands with mimicking   Awake - global aphasia  Receptive > expressive   No motor deficits, no drift   CN: PERRL,and reactive,  EOMI , mild right facial asymmetr no seizures     Plan:  Ischemic order set   ASA 325mg daily (was on 81mg daily at home), assay consistent with efficacy.   Lipitor 80mg qhs (was on simvastatin 40mg qhs)  PT/OT/ST - not accepted for acute rehab, family requesting patient to go to [facility]

## 2020-03-31 NOTE — PLAN OF CARE
Assumed care of patient at 0700  Patient follows most commands but unable to assess orientation  Patient still exhibiting poor safety awareness with some impulsivity  Posey vest d/c'd at 1030  Fall mats in place following d/c of restraint  Bed alarm on  Pa retention  Outcome: Adequate for Discharge     Problem: NEUROLOGICAL - ADULT  Goal: Achieves stable or improved neurological status  Description  INTERVENTIONS  - Assess for and report changes in neurological status  - Initiate measures to prevent increase maximal communication potential  Description  Interventions:  Strategies for Communication:  1. Make sure you have the person's attention before you start. 2. Minimize or eliminate background noise (TV, radio, other people)  3.  Keep your own voice at norm Promote sitting position while performing ADLs such as feeding, grooming, and bathing  - Educate and encourage patient/family in tolerated functional activity level and precautions during self-care     Outcome: Adequate for Discharge     Problem: Patient/F

## 2020-03-31 NOTE — PROGRESS NOTES
[facility] HOSPITAL[facility]  Progress Note     Mary Bloodgood Patient Status:  Inpatient    1945 MRN [id number]   [facility] 7NE-A Attending Wily Us MD   [facility] Day # 4 PCP Beth Franco DO     Chief Complaint: expressive aphasia     S (based on SCr of 0.7 mg/dL). No results for input(s): PTP, INR in the last 168 hours. No results for input(s): TROP, CK in the last 168 hours. Imaging: Imaging data reviewed in Epic.     Medications:   • atorvastatin  80 mg Oral Nightly   • Se

## 2020-03-31 NOTE — CM/SW NOTE
Pt's  called to say that he would like pt to go to [facility]. Explained that pt may not qualify for [facility] and that insurance auth would need to be obtained from [facility].   Referrals sent to all the [address] [place] [address]. and Wilfred at 's reque

## 2020-03-31 NOTE — CM/SW NOTE
03/31/20 1500   Discharge disposition   Expected discharge disposition Home or Self   Name of [facility]   Outpatient services Physical therapy; Occupational therapy   Home services after discharge None   Discharge transportation Pr

## 2020-03-31 NOTE — DISCHARGE SUMMARY
[facility] [place] HOSPITALIST  DISCHARGE SUMMARY     Iain Bland Patient Status:  Inpatient    1945 MRN [id number]   [facility] 7NE-A Attending Tre Mcdaniel MD   [facility] Day # 4 PCP Chris Lopez DO     Date of Admission: 3/27/2020  Date of Synopsis: patient found to have acute left MCA stroke. Her Echo negative for shunt. She has been placed on high dose of ASA and statin and will be dc with 30 days holter monitor. Cardiologist did not believe that KAY is warranted at this time.  Patient othe (2000 UT) Caps      Take 1 capsule by mouth daily. Refills:  0     VITAMIN D OR      Take 1 tablet by mouth daily.    Refills:  0        STOP taking these medications    simvastatin 40 MG Tabs  Commonly known as:  ZOCOR              Where to [address] [facility]

## 2020-04-01 ENCOUNTER — TELEPHONE (OUTPATIENT)
Dept: CASE MANAGEMENT | Age: 75
End: 2020-04-01

## 2020-04-01 ENCOUNTER — TELEPHONE (OUTPATIENT)
Dept: FAMILY MEDICINE CLINIC | Facility: CLINIC | Age: 75
End: 2020-04-01

## 2020-04-01 ENCOUNTER — HOSPITAL ENCOUNTER (OUTPATIENT)
Dept: CV DIAGNOSTICS | Facility: HOSPITAL | Age: 75
Discharge: HOME | End: 2020-04-01
Attending: INTERNAL MEDICINE
Payer: MEDICARE

## 2020-04-01 ENCOUNTER — PATIENT OUTREACH (OUTPATIENT)
Dept: CASE MANAGEMENT | Age: 75
End: 2020-04-01

## 2020-04-01 DIAGNOSIS — I63.9 IMPENDING CEREBROVASCULAR ACCIDENT (HCC): ICD-10-CM

## 2020-04-01 DIAGNOSIS — R73.9 HYPERGLYCEMIA: ICD-10-CM

## 2020-04-01 DIAGNOSIS — R47.01 APHASIA: ICD-10-CM

## 2020-04-01 DIAGNOSIS — I63.412 CEREBROVASCULAR ACCIDENT (CVA) DUE TO EMBOLISM OF LEFT MIDDLE CEREBRAL ARTERY (HCC): ICD-10-CM

## 2020-04-01 DIAGNOSIS — R47.01 EXPRESSIVE APHASIA: ICD-10-CM

## 2020-04-01 DIAGNOSIS — R94.31 ABNORMAL EKG: ICD-10-CM

## 2020-04-01 DIAGNOSIS — R41.82 ALTERED MENTAL STATUS, UNSPECIFIED ALTERED MENTAL STATUS TYPE: ICD-10-CM

## 2020-04-01 DIAGNOSIS — I63.9 ACUTE CVA (CEREBROVASCULAR ACCIDENT) (HCC): ICD-10-CM

## 2020-04-01 DIAGNOSIS — I63.9 ACUTE CEREBROVASCULAR ACCIDENT (HCC): Primary | ICD-10-CM

## 2020-04-01 DIAGNOSIS — Z02.9 ENCOUNTERS FOR UNSPECIFIED ADMINISTRATIVE PURPOSE: ICD-10-CM

## 2020-04-01 PROCEDURE — 93271 ECG/MONITORING AND ANALYSIS: CPT | Performed by: INTERNAL MEDICINE

## 2020-04-01 PROCEDURE — 1111F DSCHRG MED/CURRENT MED MERGE: CPT

## 2020-04-01 PROCEDURE — 93270 REMOTE 30 DAY ECG REV/REPORT: CPT | Performed by: INTERNAL MEDICINE

## 2020-04-01 PROCEDURE — 93272 ECG/REVIEW INTERPRET ONLY: CPT | Performed by: INTERNAL MEDICINE

## 2020-04-01 NOTE — TELEPHONE ENCOUNTER
Patient's daughter Ruddy Gutierrez called and states that they would like a referral for [facility] or Marleny Antonio for outpatient OT/SP for her mother.  NCM explained to Corrine that her father had said that he was given a referral from the hospital and when he s

## 2020-04-01 NOTE — PAYOR COMM NOTE
--------------  DISCHARGE REVIEW    Marvel Sánchez MA [facility]  Subscriber #:  [id number]  Authorization Number: [id number]    Admit date: 3/27/20  Admit time:  5459  Discharge Date: 3/31/2020  5:02 PM     Admitting Physician: Jalyn Llanos MD  Attending Kalen Rush

## 2020-04-01 NOTE — PROGRESS NOTES
Initial Post Discharge Follow Up   Discharge Date: 3/31/20  Contact Date: 4/1/2020    Consent Verification:  Assessment Completed With: Spouse: Annabella Mendoza received per patient?  written  HIPAA Verified? Yes    Discharge Dx:     1.  Acute left MCA the hospital was your diagnoses explained to you? Yes  • Do you have any questions about your diagnoses?  No  • Are you able to perform normal daily activities of living as you have prior to your hospital stay (dressing, bathing, ambulating to the bathroom, No    Referrals/orders at D/C:  Home Health/Services ordered at D/C?   Yes   What services:   PT/OT/SP  Except for [address] mentioned above, have you scheduled these other services?    no, Soraya Montgomery states that he needs a referral for outpatient th Latrell Patten is aware of her appointment with KAYLA Saldaña in Cardiology on 5/5/2020.      Interventions by NCM:  NCM reviewed medications, discharge instructions, S&S of infection/blood clots/S&S of stroke including acronym F.A.S.T., patient's husb

## 2020-04-02 ENCOUNTER — TELEPHONE (OUTPATIENT)
Dept: CASE MANAGEMENT | Age: 75
End: 2020-04-02

## 2020-04-02 ENCOUNTER — TELEPHONE (OUTPATIENT)
Dept: FAMILY MEDICINE CLINIC | Facility: CLINIC | Age: 75
End: 2020-04-02

## 2020-04-02 NOTE — TELEPHONE ENCOUNTER
Patient's daughter Gage Silver called [facility] and left a  requesting that [facility] call her father to let him know if the OT/SP has been approved for her mother Fior Torres.     [facility] placed a call to Dr. Azalia Chavez office, OT has been approved, however, still Kiana Guadalupe

## 2020-04-02 NOTE — TELEPHONE ENCOUNTER
Wife had a stroke   would really like to speak to Dr. Jef Mccord regarding some concerns. I told him that I could send message to her nurse and she would need to gather some information from him. Part of his concern is the insurance being an hmo.

## 2020-04-02 NOTE — TELEPHONE ENCOUNTER
Dr. Rafael Gomez to  regarding referrals placed for OT and Speech. When do you want to do HFU? As a phone call? Video ?

## 2020-04-02 NOTE — TELEPHONE ENCOUNTER
My understanding is that the HFU need to be in office so she needs to schedule with in office provider   Rene Santa for PT OT order  (I was able to see Newt Rings when I was inpatient on Tuesday with the hospitalist)

## 2020-04-03 ENCOUNTER — TELEPHONE (OUTPATIENT)
Dept: FAMILY MEDICINE CLINIC | Facility: CLINIC | Age: 75
End: 2020-04-03

## 2020-04-03 NOTE — TELEPHONE ENCOUNTER
Spoke to  - given # to call [facility]  ability lab.  Referrals mailed to patient's home per patient request.

## 2020-04-03 NOTE — TELEPHONE ENCOUNTER
Pt's  received call from [facility] [employer] stating services have been approved. He would like some direction on what needs to be done next and who is in charge of setting all of these services up.   Pls call to discuss - family having difficulty understanding

## 2020-04-06 ENCOUNTER — TELEPHONE (OUTPATIENT)
Dept: INTERNAL MEDICINE CLINIC | Facility: CLINIC | Age: 75
End: 2020-04-06

## 2020-04-06 ENCOUNTER — VIRTUAL PHONE E/M (OUTPATIENT)
Dept: INTERNAL MEDICINE CLINIC | Facility: CLINIC | Age: 75
End: 2020-04-06
Payer: MEDICARE

## 2020-04-06 DIAGNOSIS — I63.412 CEREBROVASCULAR ACCIDENT (CVA) DUE TO EMBOLISM OF LEFT MIDDLE CEREBRAL ARTERY (HCC): Primary | ICD-10-CM

## 2020-04-06 DIAGNOSIS — R41.82 ALTERED MENTAL STATUS, UNSPECIFIED ALTERED MENTAL STATUS TYPE: ICD-10-CM

## 2020-04-06 DIAGNOSIS — E78.5 HYPERLIPIDEMIA, UNSPECIFIED HYPERLIPIDEMIA TYPE: ICD-10-CM

## 2020-04-06 DIAGNOSIS — I10 ESSENTIAL HYPERTENSION: ICD-10-CM

## 2020-04-06 PROCEDURE — 99443 PHONE E/M BY PHYS 21-30 MIN: CPT | Performed by: CLINICAL NURSE SPECIALIST

## 2020-04-06 RX ORDER — FAMOTIDINE 40 MG/1
20 TABLET, FILM COATED ORAL 2 TIMES DAILY
COMMUNITY
End: 2021-03-08 | Stop reason: ALTCHOICE

## 2020-04-06 NOTE — TELEPHONE ENCOUNTER
TRANSITIONAL CARE CLINIC PHARMACIST MEDICATION RECONCILIATION        Kyleigh Diego MRN [id number]    1945 PCP Jadon Dudley DO       Comments: Medication history completed by [address] [place] [address] Pharmacist over the phone with patients spo confirmed understanding.      Thank you,    Mimi Small, PharmD, 4/6/2020, 10:26 AM  [facility]

## 2020-04-06 NOTE — PROGRESS NOTES
Printed referrals for OT/speech and faxed to [employer] location as well as to [employer].    Called pt's  and informed him of this as well as the fact that pt's insurance is not accepted at [facility] and he will need to contact his insu

## 2020-04-06 NOTE — PROGRESS NOTES
Virtual/Telephone Check-In    Elouise Pallas verbally consents to a Virtual/Telephone Check-In service on 04/06/20. Patient understands and accepts financial responsibility for any deductible, co-insurance and/or co-pays associated with this service. requesting referral for psychologist  · Family instructed to contact [facility] for approved providers  2. Altered mental status, unspecified altered mental status type (RESOLVED)    3. Hyperlipidemia, unspecified hyperlipidemia type  · Atorvastatin    4.

## 2020-04-08 ENCOUNTER — TELEPHONE (OUTPATIENT)
Dept: SURGERY | Facility: CLINIC | Age: 75
End: 2020-04-08

## 2020-04-08 DIAGNOSIS — I63.412 CEREBROVASCULAR ACCIDENT (CVA) DUE TO EMBOLISM OF LEFT MIDDLE CEREBRAL ARTERY (HCC): Primary | ICD-10-CM

## 2020-04-08 PROCEDURE — 99442 PHONE E/M BY PHYS 11-20 MIN: CPT | Performed by: PHYSICIAN ASSISTANT

## 2020-04-08 NOTE — TELEPHONE ENCOUNTER
Pt's  states he is having difficulty getting a reading with their BP machine - states he is getting an error code that the cuff is not positioned properly.  Pt's son is coming over today, he will hopefull help them get it working

## 2020-04-08 NOTE — TELEPHONE ENCOUNTER
[facility] the [facility]  Virtual/Telephone Visit      Kyleigh Shepherdlinda Patient Status:  No patient class for patient encounter    1945 MRN [id number]   Location ED [facility], [address] [place] [address], [facility] Attending No att. providers danielle low back pain without sciatica 3/15/2016   • Multinodular goiter 7/1/2011    multinodular goiter/thyroid cyst   • Obese 2006   • Obesity    • Osteoarthritis     L knee   • Tendinitis    • Thyroid nodule       Past Surgical History:   Procedure Laterality D sting\"          Objective/Physical Exam:  Limited due to virtual/telephone visit.       Neurologic:   Mental status: JOHN  Speech: aphasic, but improved per   Memory and comprehension: following more commands such as FTN per     Reviewed the f

## 2020-04-09 NOTE — TELEPHONE ENCOUNTER
Spoke to patient regarding this - he will call Lora Alvarez and [facility] to see if they could see her sooner.

## 2020-04-09 NOTE — TELEPHONE ENCOUNTER
called and stated that referrals were handled for his wife and he contacted [facility] and they are 8 weeks out. He knows that starting rehab is very important to do as early as possible. What options does he have? Would like to talk to [facility].

## 2020-04-20 ENCOUNTER — TELEPHONE (OUTPATIENT)
Dept: NEUROLOGY | Facility: CLINIC | Age: 75
End: 2020-04-20

## 2020-04-20 DIAGNOSIS — I63.9 ACUTE CVA (CEREBROVASCULAR ACCIDENT) (HCC): Primary | ICD-10-CM

## 2020-04-20 RX ORDER — ASPIRIN 325 MG
325 TABLET ORAL DAILY
Qty: 30 TABLET | Refills: 1 | Status: SHIPPED | OUTPATIENT
Start: 2020-04-20 | End: 2020-06-17

## 2020-04-20 RX ORDER — ATORVASTATIN CALCIUM 80 MG/1
80 TABLET, FILM COATED ORAL NIGHTLY
Qty: 30 TABLET | Refills: 1 | Status: SHIPPED | OUTPATIENT
Start: 2020-04-20 | End: 2020-06-15

## 2020-04-20 NOTE — TELEPHONE ENCOUNTER
Husam Dykes from transitional care clinic is calling with questions regarding medications that were prescribed upon d/c from hospitalization, and patient also needs refills. B: Pt hospitalized for acute CVS on 3/27/20.   Kilo GARZA has phone consultation

## 2020-04-20 NOTE — TELEPHONE ENCOUNTER
Note that Rxs filled, but still need answers to following questions:    Should patient continue with famotidine? If so, will need a new Rx. Pt has been taking lisinopril 10mg daily, but BP has been running 98/82 with pulse of 100. She is asymptomatic.

## 2020-04-21 NOTE — TELEPHONE ENCOUNTER
Anup Diaz MD  You 1 hour ago (9:18 AM)      Ok to hold Lisinopril with SBP <110. Monitor BP daily     No need to continue Famotidine. Thanks    Routing comment      Notified Minh Mccormack with the [facility] of above message.

## 2020-04-22 ENCOUNTER — TELEPHONE (OUTPATIENT)
Dept: SPEECH THERAPY | Facility: HOSPITAL | Age: 75
End: 2020-04-22

## 2020-05-01 ENCOUNTER — TELEPHONE (OUTPATIENT)
Dept: NEUROLOGY | Facility: CLINIC | Age: 75
End: 2020-05-01

## 2020-05-01 NOTE — TELEPHONE ENCOUNTER
Patient was told by cardiology to stop lisinopril. Here are  Patient's most up to date blood pressure readings. Message sent to dr. Adair Dooley as well.        4/25 96/78  4/27 112/83  4/30 123/80  5/1   124/85

## 2020-05-01 NOTE — TELEPHONE ENCOUNTER
Spoke with Brad Zuñiga with speech therapy. States she has been working with pt for approx 1 week and notes that pt has improved. States that originally was noted to have global aphasia, but more conductive aphasia and difficulty with verbal expression. States she has trouble with communication, reading, calculation. Also based on her evaluation, seems to indicate that symptoms related to parietal lobe vs frontal.     She will send her notes over Monday before visit for review.

## 2020-05-05 ENCOUNTER — TELEPHONE (OUTPATIENT)
Dept: NEUROLOGY | Facility: CLINIC | Age: 75
End: 2020-05-05

## 2020-05-05 NOTE — TELEPHONE ENCOUNTER
Recd Daily Note dated 20; endorsed to provider. Please note the  on Daily Note says  46, that is incorrect, s/b 45, [facility] is aware of this and is changing  in their system.

## 2020-05-06 ENCOUNTER — TELEMEDICINE (OUTPATIENT)
Dept: NEUROLOGY | Facility: CLINIC | Age: 75
End: 2020-05-06
Payer: MEDICARE

## 2020-05-06 DIAGNOSIS — R47.01 GLOBAL APHASIA: ICD-10-CM

## 2020-05-06 DIAGNOSIS — I63.512 ACUTE ISCHEMIC LEFT MCA STROKE (HCC): Primary | ICD-10-CM

## 2020-05-06 PROCEDURE — 99215 OFFICE O/P EST HI 40 MIN: CPT | Performed by: OTHER

## 2020-05-06 NOTE — PROGRESS NOTES
HPI:    Patient ID: Don Frances is a 76year old female. This visit is conducted using Telemedicine with live, interactive video and audio.     Patient understands and accepts financial responsibility for any deductible, co-insurance and/or co-pay normal. Wall thickness was normal.     Systolic function was hyperdynamic. The estimated ejection fraction was     70%. There was no diagnostic evidence for regional wall motion     abnormalities.   2. Atrial septum: Agitated saline contrast study showed no the family- patient is aphasic    Review of Systems   Constitutional: Negative. HENT: Negative. Eyes: Negative. Respiratory: Negative. Cardiovascular: Negative. Gastrointestinal: Negative. Endocrine: Negative. Genitourinary: Negative. Neglect):0      mRS- 2         ASSESSMENT/PLAN:   (P63.991) Acute ischemic left MCA stroke (HCC)  (primary encounter diagnosis)    (R47.01) Global aphasia      Suspecting cardioembolism as the possible cause of stroke.  30 day event monitoring completed and above.”      No orders of the defined types were placed in this encounter.       Meds This Visit:  Requested Prescriptions      No prescriptions requested or ordered in this encounter       Imaging & Referrals:  None       IG#[id number]

## 2020-05-12 ENCOUNTER — TELEMEDICINE (OUTPATIENT)
Dept: FAMILY MEDICINE CLINIC | Facility: CLINIC | Age: 75
End: 2020-05-12
Payer: MEDICARE

## 2020-05-12 VITALS — SYSTOLIC BLOOD PRESSURE: 122 MMHG | DIASTOLIC BLOOD PRESSURE: 87 MMHG

## 2020-05-12 DIAGNOSIS — R73.03 PREDIABETES: ICD-10-CM

## 2020-05-12 DIAGNOSIS — I10 ESSENTIAL HYPERTENSION: ICD-10-CM

## 2020-05-12 DIAGNOSIS — I63.9 ACUTE CEREBROVASCULAR ACCIDENT (HCC): Primary | ICD-10-CM

## 2020-05-12 DIAGNOSIS — E78.2 MIXED HYPERLIPIDEMIA: ICD-10-CM

## 2020-05-12 DIAGNOSIS — E53.8 VITAMIN B12 DEFICIENCY: ICD-10-CM

## 2020-05-12 DIAGNOSIS — R47.01 APHASIA: ICD-10-CM

## 2020-05-12 DIAGNOSIS — E55.9 VITAMIN D DEFICIENCY: ICD-10-CM

## 2020-05-12 PROCEDURE — 99214 OFFICE O/P EST MOD 30 MIN: CPT | Performed by: FAMILY MEDICINE

## 2020-05-12 NOTE — PROGRESS NOTES
This visit is conducted using Telemedicine with live, interactive video and audio.       Telehealth outside of [address] Justine Verbal Consent   I conducted a telehealth visit with Ro Bryant today, 05/12/20, which was completed using two-way, re until [facility] opens back up due to covid     has been watching bp     Pt on a different statin now and tolerating it.    Pt was advised to stop the lisinipril by Dr. Rupali Mayorga   Suspect cardioembolim as cause of cva   ASA was increased to 325mg   N Social History:   Social History    Tobacco Use      Smoking status: Never Smoker      Smokeless tobacco: Never Used    Alcohol use: No      Alcohol/week: 0.0 standard drinks    Drug use: No    Occ: . : . Children: .    Exercise: minimal.  Diet: wa

## 2020-05-18 ENCOUNTER — TELEPHONE (OUTPATIENT)
Dept: NEUROLOGY | Facility: CLINIC | Age: 75
End: 2020-05-18

## 2020-06-02 ENCOUNTER — LAB ENCOUNTER (OUTPATIENT)
Dept: LAB | Facility: HOSPITAL | Age: 75
End: 2020-06-02
Attending: INTERNAL MEDICINE
Payer: MEDICARE

## 2020-06-02 DIAGNOSIS — Z01.812 PRE-PROCEDURE LAB EXAM: Primary | ICD-10-CM

## 2020-06-03 ENCOUNTER — LAB ENCOUNTER (OUTPATIENT)
Dept: LAB | Facility: HOSPITAL | Age: 75
End: 2020-06-03
Attending: INTERNAL MEDICINE
Payer: MEDICARE

## 2020-06-05 ENCOUNTER — HOSPITAL ENCOUNTER (OUTPATIENT)
Dept: INTERVENTIONAL RADIOLOGY/VASCULAR | Facility: HOSPITAL | Age: 75
Discharge: HOME | End: 2020-06-05
Attending: INTERNAL MEDICINE | Admitting: INTERNAL MEDICINE
Payer: MEDICARE

## 2020-06-05 VITALS
DIASTOLIC BLOOD PRESSURE: 71 MMHG | OXYGEN SATURATION: 99 % | WEIGHT: 155 LBS | BODY MASS INDEX: 28.52 KG/M2 | HEART RATE: 93 BPM | HEIGHT: 62 IN | RESPIRATION RATE: 22 BRPM | TEMPERATURE: 98 F | SYSTOLIC BLOOD PRESSURE: 150 MMHG

## 2020-06-05 DIAGNOSIS — I63.9 CVA (CEREBRAL VASCULAR ACCIDENT) (HCC): Primary | ICD-10-CM

## 2020-06-05 PROCEDURE — 0JH632Z INSERTION OF MONITORING DEVICE INTO CHEST SUBCUTANEOUS TISSUE AND FASCIA, PERCUTANEOUS APPROACH: ICD-10-PCS | Performed by: INTERNAL MEDICINE

## 2020-06-05 PROCEDURE — 33285 INSJ SUBQ CAR RHYTHM MNTR: CPT

## 2020-06-05 RX ORDER — LIDOCAINE HYDROCHLORIDE AND EPINEPHRINE 10; 10 MG/ML; UG/ML
INJECTION, SOLUTION INFILTRATION; PERINEURAL
Status: COMPLETED
Start: 2020-06-05 | End: 2020-06-05

## 2020-06-05 NOTE — PROGRESS NOTES
Patient here for Linq insertion. Informed consent obtained. Patient with residual aphagia from recent CVA, so her  is at her bedside and assisting with consent and questions/answers. Patient prepped.  Patient and  viewed Pieter Owens video prior to in

## 2020-06-05 NOTE — PROCEDURES
[facility]    PATIENT'S NAME: Mile Hurley   ATTENDING PHYSICIAN: Fern Rosenbaum M.D. OPERATING PHYSICIAN: Fern Rosenbaum M.D.    PATIENT ACCOUNT#:   [de-identified]    LOCATION:  [facility] 9 EDWP 10  MEDICAL RECORD #:   [id number]       DATE OF B

## 2020-06-14 DIAGNOSIS — I63.9 ACUTE CVA (CEREBROVASCULAR ACCIDENT) (HCC): ICD-10-CM

## 2020-06-15 RX ORDER — ATORVASTATIN CALCIUM 80 MG/1
80 TABLET, FILM COATED ORAL NIGHTLY
Qty: 30 TABLET | Refills: 1 | Status: SHIPPED | OUTPATIENT
Start: 2020-06-15 | End: 2020-07-16

## 2020-06-15 NOTE — TELEPHONE ENCOUNTER
Medication: ATORVASTATIN 80 MG Oral Tab    Date of last refill: 04/20/2020 (#30/1)  [place]    Last office visit: 05/06/2020  Due back to clinic per last office note:  Around 3 months  Date next office visit schedule

## 2020-06-16 DIAGNOSIS — I63.9 ACUTE CVA (CEREBROVASCULAR ACCIDENT) (HCC): ICD-10-CM

## 2020-06-17 NOTE — TELEPHONE ENCOUNTER
Medication: ASPIRIN 325 MG Oral Tab EC    Date of last refill: 04/20/2020 (#30/1)  [place]    Last office visit: 05/06/2020  Due back to clinic per last office note:  3 months  Date next office visit scheduled:     Jose

## 2020-06-22 ENCOUNTER — TELEPHONE (OUTPATIENT)
Dept: FAMILY MEDICINE CLINIC | Facility: CLINIC | Age: 75
End: 2020-06-22

## 2020-06-22 NOTE — TELEPHONE ENCOUNTER
Okay for note?    Patients  called stating that his wife will be starting Speech Therapy at [facility] on July 1.     States they called him requesting that a letter be faxed [phone] to them from her PCP that states \"patient has no

## 2020-06-22 NOTE — TELEPHONE ENCOUNTER
Patients  called stating that his wife will be starting Speech Therapy at [facility] on July 1.     States they called him requesting that a letter be faxed [phone] to them from her PCP that states \"patient has no underling conditio 60

## 2020-07-06 ENCOUNTER — LAB ENCOUNTER (OUTPATIENT)
Dept: LAB | Facility: HOSPITAL | Age: 75
End: 2020-07-06
Attending: FAMILY MEDICINE
Payer: MEDICARE

## 2020-07-06 DIAGNOSIS — R73.03 PREDIABETES: ICD-10-CM

## 2020-07-06 DIAGNOSIS — E78.2 MIXED HYPERLIPIDEMIA: ICD-10-CM

## 2020-07-06 DIAGNOSIS — E55.9 VITAMIN D DEFICIENCY: ICD-10-CM

## 2020-07-06 DIAGNOSIS — E53.8 VITAMIN B12 DEFICIENCY: ICD-10-CM

## 2020-07-06 DIAGNOSIS — I10 ESSENTIAL HYPERTENSION: ICD-10-CM

## 2020-07-06 DIAGNOSIS — I63.9 ACUTE CEREBROVASCULAR ACCIDENT (HCC): ICD-10-CM

## 2020-07-06 LAB
ALBUMIN SERPL-MCNC: 3.4 G/DL (ref 3.4–5)
ALBUMIN/GLOB SERPL: 0.9 {RATIO} (ref 1–2)
ALP LIVER SERPL-CCNC: 76 U/L (ref 55–142)
ALT SERPL-CCNC: 21 U/L (ref 13–56)
ANION GAP SERPL CALC-SCNC: 3 MMOL/L (ref 0–18)
AST SERPL-CCNC: 17 U/L (ref 15–37)
BASOPHILS # BLD AUTO: 0.05 X10(3) UL (ref 0–0.2)
BASOPHILS NFR BLD AUTO: 0.8 %
BILIRUB SERPL-MCNC: 0.6 MG/DL (ref 0.1–2)
BUN BLD-MCNC: 21 MG/DL (ref 7–18)
BUN/CREAT SERPL: 26.3 (ref 10–20)
CALCIUM BLD-MCNC: 9.2 MG/DL (ref 8.5–10.1)
CHLORIDE SERPL-SCNC: 111 MMOL/L (ref 98–112)
CHOLEST SMN-MCNC: 138 MG/DL (ref ?–200)
CO2 SERPL-SCNC: 27 MMOL/L (ref 21–32)
CREAT BLD-MCNC: 0.8 MG/DL (ref 0.55–1.02)
DEPRECATED RDW RBC AUTO: 47.6 FL (ref 35.1–46.3)
EOSINOPHIL # BLD AUTO: 0.14 X10(3) UL (ref 0–0.7)
EOSINOPHIL NFR BLD AUTO: 2.3 %
ERYTHROCYTE [DISTWIDTH] IN BLOOD BY AUTOMATED COUNT: 14.2 % (ref 11–15)
EST. AVERAGE GLUCOSE BLD GHB EST-MCNC: 131 MG/DL (ref 68–126)
GLOBULIN PLAS-MCNC: 3.7 G/DL (ref 2.8–4.4)
GLUCOSE BLD-MCNC: 83 MG/DL (ref 70–99)
HBA1C MFR BLD HPLC: 6.2 % (ref ?–5.7)
HCT VFR BLD AUTO: 46.8 % (ref 35–48)
HDLC SERPL-MCNC: 56 MG/DL (ref 40–59)
HGB BLD-MCNC: 14.4 G/DL (ref 12–16)
IMM GRANULOCYTES # BLD AUTO: 0.02 X10(3) UL (ref 0–1)
IMM GRANULOCYTES NFR BLD: 0.3 %
LDLC SERPL CALC-MCNC: 59 MG/DL (ref ?–100)
LYMPHOCYTES # BLD AUTO: 1.42 X10(3) UL (ref 1–4)
LYMPHOCYTES NFR BLD AUTO: 23.7 %
M PROTEIN MFR SERPL ELPH: 7.1 G/DL (ref 6.4–8.2)
MCH RBC QN AUTO: 28 PG (ref 26–34)
MCHC RBC AUTO-ENTMCNC: 30.8 G/DL (ref 31–37)
MCV RBC AUTO: 91.1 FL (ref 80–100)
MONOCYTES # BLD AUTO: 0.55 X10(3) UL (ref 0.1–1)
MONOCYTES NFR BLD AUTO: 9.2 %
NEUTROPHILS # BLD AUTO: 3.8 X10 (3) UL (ref 1.5–7.7)
NEUTROPHILS # BLD AUTO: 3.8 X10(3) UL (ref 1.5–7.7)
NEUTROPHILS NFR BLD AUTO: 63.7 %
NONHDLC SERPL-MCNC: 82 MG/DL (ref ?–130)
OSMOLALITY SERPL CALC.SUM OF ELEC: 294 MOSM/KG (ref 275–295)
PATIENT FASTING Y/N/NP: YES
PATIENT FASTING Y/N/NP: YES
PLATELET # BLD AUTO: 230 10(3)UL (ref 150–450)
POTASSIUM SERPL-SCNC: 4.6 MMOL/L (ref 3.5–5.1)
RBC # BLD AUTO: 5.14 X10(6)UL (ref 3.8–5.3)
SODIUM SERPL-SCNC: 141 MMOL/L (ref 136–145)
T4 FREE SERPL-MCNC: 1.1 NG/DL (ref 0.8–1.7)
TRIGL SERPL-MCNC: 114 MG/DL (ref 30–149)
TSI SER-ACNC: 0.55 MIU/ML (ref 0.36–3.74)
VIT B12 SERPL-MCNC: 1155 PG/ML (ref 193–986)
VIT D+METAB SERPL-MCNC: 17.4 NG/ML (ref 30–100)
VLDLC SERPL CALC-MCNC: 23 MG/DL (ref 0–30)
WBC # BLD AUTO: 6 X10(3) UL (ref 4–11)

## 2020-07-06 PROCEDURE — 80053 COMPREHEN METABOLIC PANEL: CPT

## 2020-07-06 PROCEDURE — 83036 HEMOGLOBIN GLYCOSYLATED A1C: CPT

## 2020-07-06 PROCEDURE — 82607 VITAMIN B-12: CPT

## 2020-07-06 PROCEDURE — 84439 ASSAY OF FREE THYROXINE: CPT

## 2020-07-06 PROCEDURE — 80061 LIPID PANEL: CPT

## 2020-07-06 PROCEDURE — 85025 COMPLETE CBC W/AUTO DIFF WBC: CPT

## 2020-07-06 PROCEDURE — 82306 VITAMIN D 25 HYDROXY: CPT

## 2020-07-06 PROCEDURE — 84443 ASSAY THYROID STIM HORMONE: CPT

## 2020-07-06 PROCEDURE — 36415 COLL VENOUS BLD VENIPUNCTURE: CPT

## 2020-07-16 ENCOUNTER — OFFICE VISIT (OUTPATIENT)
Dept: FAMILY MEDICINE CLINIC | Facility: CLINIC | Age: 75
End: 2020-07-16
Payer: MEDICARE

## 2020-07-16 VITALS
DIASTOLIC BLOOD PRESSURE: 76 MMHG | OXYGEN SATURATION: 98 % | HEIGHT: 62 IN | BODY MASS INDEX: 27.97 KG/M2 | WEIGHT: 152 LBS | RESPIRATION RATE: 16 BRPM | SYSTOLIC BLOOD PRESSURE: 107 MMHG | TEMPERATURE: 98 F | HEART RATE: 82 BPM

## 2020-07-16 DIAGNOSIS — F09 COGNITIVE DISORDER: ICD-10-CM

## 2020-07-16 DIAGNOSIS — R47.01 EXPRESSIVE APHASIA: ICD-10-CM

## 2020-07-16 DIAGNOSIS — I10 ESSENTIAL HYPERTENSION: ICD-10-CM

## 2020-07-16 DIAGNOSIS — Z12.31 ENCOUNTER FOR SCREENING MAMMOGRAM FOR HIGH-RISK PATIENT: ICD-10-CM

## 2020-07-16 DIAGNOSIS — R73.9 HYPERGLYCEMIA: ICD-10-CM

## 2020-07-16 DIAGNOSIS — R47.01 APHASIA: ICD-10-CM

## 2020-07-16 DIAGNOSIS — E55.9 VITAMIN D DEFICIENCY: ICD-10-CM

## 2020-07-16 DIAGNOSIS — I63.412 CEREBROVASCULAR ACCIDENT (CVA) DUE TO EMBOLISM OF LEFT MIDDLE CEREBRAL ARTERY (HCC): ICD-10-CM

## 2020-07-16 DIAGNOSIS — E04.9 GOITER: ICD-10-CM

## 2020-07-16 DIAGNOSIS — E66.3 OVERWEIGHT (BMI 25.0-29.9): ICD-10-CM

## 2020-07-16 DIAGNOSIS — E78.2 MIXED HYPERLIPIDEMIA: ICD-10-CM

## 2020-07-16 DIAGNOSIS — Z00.00 ENCOUNTER FOR ANNUAL HEALTH EXAMINATION: Primary | ICD-10-CM

## 2020-07-16 DIAGNOSIS — I63.9 ACUTE CVA (CEREBROVASCULAR ACCIDENT) (HCC): ICD-10-CM

## 2020-07-16 DIAGNOSIS — I87.2 VENOUS INSUFFICIENCY: ICD-10-CM

## 2020-07-16 PROBLEM — R79.89 AZOTEMIA: Status: RESOLVED | Noted: 2020-03-27 | Resolved: 2020-07-16

## 2020-07-16 PROBLEM — R41.82 ALTERED MENTAL STATUS, UNSPECIFIED ALTERED MENTAL STATUS TYPE: Status: RESOLVED | Noted: 2020-03-27 | Resolved: 2020-07-16

## 2020-07-16 PROBLEM — R41.82 ALTERED MENTAL STATUS: Status: RESOLVED | Noted: 2020-03-27 | Resolved: 2020-07-16

## 2020-07-16 PROCEDURE — G0439 PPPS, SUBSEQ VISIT: HCPCS | Performed by: FAMILY MEDICINE

## 2020-07-16 PROCEDURE — 96160 PT-FOCUSED HLTH RISK ASSMT: CPT | Performed by: FAMILY MEDICINE

## 2020-07-16 PROCEDURE — 99397 PER PM REEVAL EST PAT 65+ YR: CPT | Performed by: FAMILY MEDICINE

## 2020-07-16 PROCEDURE — 3074F SYST BP LT 130 MM HG: CPT | Performed by: FAMILY MEDICINE

## 2020-07-16 PROCEDURE — 3078F DIAST BP <80 MM HG: CPT | Performed by: FAMILY MEDICINE

## 2020-07-16 PROCEDURE — 3008F BODY MASS INDEX DOCD: CPT | Performed by: FAMILY MEDICINE

## 2020-07-16 RX ORDER — ATORVASTATIN CALCIUM 80 MG/1
80 TABLET, FILM COATED ORAL NIGHTLY
Qty: 90 TABLET | Refills: 3 | Status: SHIPPED | OUTPATIENT
Start: 2020-07-16 | End: 2020-09-14

## 2020-07-16 NOTE — PATIENT INSTRUCTIONS
Chantell Adams's SCREENING SCHEDULE   Tests on this list are recommended by your physician but may not be covered, or covered at this frequency, by your insurer. Please check with your insurance carrier before scheduling to verify coverage.    PREVENT criteria:   • Men who are 73-68 years old and have smoked more than 100 cigarettes in their lifetime   • Anyone with a family history    Colorectal Cancer Screening  Covered up to Age 76     Colonoscopy Screen   Covered every 10 years- more often if abnorm Mammogram due on 12/23/2020 Please get this Mammogram regularly   Immunizations      Influenza  Covered Annually Orders placed or performed in visit on 11/26/19   • FLU VACC HIGH DOSE PRSV FREE   Orders placed or performed in visit on 12/13/16   • FLU VACC different types of Advance Directives. It also has the State forms available on it's website for anyone to review and print using their home computer and printer. (the forms are also available in [place]  www. [employer]. org  This link also has informa

## 2020-07-16 NOTE — PROGRESS NOTES
HPI:   Anselmo Barthel is a 76year old female who presents for a MA (Medicare Advantage) Supervisit (Once per calendar year).       Annual Physical due on 11/26/2020        Fall/Risk Assessment   She has been screened for Falls and is low risk: Fall/R speech therapy      Cognitive disorder- in speech therapy      Expressive aphasia- slightly improved     Wt Readings from Last 3 Encounters:  07/16/20 : 152 lb (68.9 kg)  06/03/20 : 155 lb (70.3 kg)  03/27/20 : 156 lb 8.4 oz (71 kg)     Last Cholesterol La smokeless tobacco. She reports that she does not drink alcohol or use drugs.      REVIEW OF SYSTEMS:   GENERAL: feels well otherwise  SKIN: denies any unusual skin lesions  EYES: denies blurred vision or double vision  HEENT: denies nasal congestion, sinus auscultation bilaterally, respirations unlabored   Heart:  Regular rate and rhythm, S1 and S2 normal, no murmur, rub, or gallop   Abdomen:   Soft, non-tender, bowel sounds active all four quadrants,  no masses, no organomegaly   Pelvic: Deferred   Extremit SCREENING BILAT (CPT=77067/93891); Future    Goiter- check labs   -     US THYROID (BNX=98214); Future    Venous insufficiency- monitor     Overweight (BMI 25.0-29. 9)- discussed exercise     Mixed hyperlipidemia- on statin     Expressive aphasia- continue Occult Blood Result (no units)   Date Value   12/03/2019 Negative for Occult Blood   12/03/2019 Negative for Occult Blood   12/03/2019 Negative for Occult Blood    No flowsheet data found.     Glaucoma Screening      Ophthalmology Visit Annually: Diabetics, benefits                    Template: [employer]OW[employer] MEDICARE ANNUAL ASSESSMENT FEMALE [[id number]]

## 2020-07-21 ENCOUNTER — TELEPHONE (OUTPATIENT)
Dept: FAMILY MEDICINE CLINIC | Facility: CLINIC | Age: 75
End: 2020-07-21

## 2020-07-21 DIAGNOSIS — R47.01 APHASIA: ICD-10-CM

## 2020-07-21 DIAGNOSIS — I63.9 ACUTE CVA (CEREBROVASCULAR ACCIDENT) (HCC): Primary | ICD-10-CM

## 2020-07-21 NOTE — TELEPHONE ENCOUNTER
Pt not happy with [facility], they were closed for Covid and pt restarted therapy July 1 with [facility].       Pt now seeing [facility], Lorri Braga is the therapist.      Pt asking for referral for this therapist.     Referral entered, await approval

## 2020-07-23 ENCOUNTER — TELEPHONE (OUTPATIENT)
Dept: FAMILY MEDICINE CLINIC | Facility: CLINIC | Age: 75
End: 2020-07-23

## 2020-07-23 NOTE — TELEPHONE ENCOUNTER
Left message to call back. Per [facility] [employer] [facility] is not in network with the patient's policy and patient has no out of network benefits. Please select an in network therapist for patient to see. This referral will be closed.      Thanks,

## 2020-07-24 ENCOUNTER — TELEPHONE (OUTPATIENT)
Dept: FAMILY MEDICINE CLINIC | Facility: CLINIC | Age: 75
End: 2020-07-24

## 2020-07-24 NOTE — TELEPHONE ENCOUNTER
Osmin Doherty called and stated that Rah Rivers wants to work with us on this referral.  Her name and extension are listed below.

## 2020-07-24 NOTE — TELEPHONE ENCOUNTER
Spoke to Susie Mata - she was referred to Referral Department for further help with a denied referral.  aware this is going back to Referral dept.

## 2020-08-11 ENCOUNTER — HOSPITAL ENCOUNTER (OUTPATIENT)
Dept: CT IMAGING | Facility: HOSPITAL | Age: 75
Discharge: HOME | End: 2020-08-11
Attending: FAMILY MEDICINE

## 2020-08-11 DIAGNOSIS — Z13.6 SCREENING FOR CARDIOVASCULAR CONDITION: ICD-10-CM

## 2020-09-11 ENCOUNTER — TELEPHONE (OUTPATIENT)
Dept: FAMILY MEDICINE CLINIC | Facility: CLINIC | Age: 75
End: 2020-09-11

## 2020-09-11 NOTE — TELEPHONE ENCOUNTER
is trying to keep pt's speech therapy going and there are some insurance issues trying not to fund this. He has drafted a letter to the insurance and wants Dr. Nataliia Davis to review and sign as her own.   Pls call to discuss asap as this is a time sens

## 2020-09-17 ENCOUNTER — OFFICE VISIT (OUTPATIENT)
Dept: NEUROLOGY | Facility: CLINIC | Age: 75
End: 2020-09-17
Payer: MEDICARE

## 2020-09-17 VITALS
HEART RATE: 86 BPM | BODY MASS INDEX: 28 KG/M2 | SYSTOLIC BLOOD PRESSURE: 120 MMHG | DIASTOLIC BLOOD PRESSURE: 68 MMHG | WEIGHT: 155 LBS | RESPIRATION RATE: 16 BRPM

## 2020-09-17 DIAGNOSIS — R47.01 GLOBAL APHASIA: ICD-10-CM

## 2020-09-17 DIAGNOSIS — I63.9 ACUTE CVA (CEREBROVASCULAR ACCIDENT) (HCC): Primary | ICD-10-CM

## 2020-09-17 DIAGNOSIS — I63.512 ACUTE ISCHEMIC LEFT MCA STROKE (HCC): ICD-10-CM

## 2020-09-17 PROCEDURE — 3078F DIAST BP <80 MM HG: CPT | Performed by: OTHER

## 2020-09-17 PROCEDURE — 3074F SYST BP LT 130 MM HG: CPT | Performed by: OTHER

## 2020-09-17 PROCEDURE — 99213 OFFICE O/P EST LOW 20 MIN: CPT | Performed by: OTHER

## 2020-09-17 RX ORDER — ATORVASTATIN CALCIUM 80 MG/1
80 TABLET, FILM COATED ORAL NIGHTLY
COMMUNITY
End: 2021-06-14

## 2020-09-17 NOTE — PROGRESS NOTES
[facility] in [address] [place] [address]  Neurology - Clinic Follow up  2020, 2:00 PM     Lotus Bridges Patient Status:  No patient class for patient encounter    1945 MRN [id number]   Location [unfilled] PCP Stacia Cage DO diagnostic evidence for regional wall motion     abnormalities. 2. Atrial septum: Agitated saline contrast study showed no right to left     atrial level shunt. 3. Aortic valve: Trileaflet. No aortic stenosis. No aortic regurgitation.   4. Right ventricle soreness; Respiratory: Denies: Difficulty Breathing, Chronic Cough and Wheezing. Cardiovascular: NO Chest Pain and Palpitations. GI: no abdominal pain, no nausea or diarrhea;  : no incontinence;   Neurological:  See history; relevant items discussed i 07/06/2020    CA 9.2 07/06/2020     07/06/2020    K 4.6 07/06/2020     07/06/2020    CO2 27.0 07/06/2020    OSMOCALC 294 07/06/2020         Imaging:  See above    Assessment/Plan:   Acute cva (cerebrovascular accident) (hcc)  (primary encounter

## 2020-10-09 ENCOUNTER — OFFICE VISIT (OUTPATIENT)
Dept: FAMILY MEDICINE CLINIC | Facility: CLINIC | Age: 75
End: 2020-10-09

## 2020-10-09 VITALS
WEIGHT: 151 LBS | HEART RATE: 100 BPM | DIASTOLIC BLOOD PRESSURE: 77 MMHG | BODY MASS INDEX: 28.51 KG/M2 | SYSTOLIC BLOOD PRESSURE: 133 MMHG | OXYGEN SATURATION: 95 % | HEIGHT: 61 IN | TEMPERATURE: 98 F | RESPIRATION RATE: 16 BRPM

## 2020-10-09 DIAGNOSIS — Z23 FLU VACCINE NEED: ICD-10-CM

## 2020-10-09 DIAGNOSIS — R39.9 UTI SYMPTOMS: Primary | ICD-10-CM

## 2020-10-09 PROCEDURE — 87186 SC STD MICRODIL/AGAR DIL: CPT | Performed by: NURSE PRACTITIONER

## 2020-10-09 PROCEDURE — 81003 URINALYSIS AUTO W/O SCOPE: CPT | Performed by: NURSE PRACTITIONER

## 2020-10-09 PROCEDURE — 87077 CULTURE AEROBIC IDENTIFY: CPT | Performed by: NURSE PRACTITIONER

## 2020-10-09 PROCEDURE — 99213 OFFICE O/P EST LOW 20 MIN: CPT | Performed by: NURSE PRACTITIONER

## 2020-10-09 PROCEDURE — G0008 ADMIN INFLUENZA VIRUS VAC: HCPCS | Performed by: NURSE PRACTITIONER

## 2020-10-09 PROCEDURE — 87086 URINE CULTURE/COLONY COUNT: CPT | Performed by: NURSE PRACTITIONER

## 2020-10-09 PROCEDURE — 90662 IIV NO PRSV INCREASED AG IM: CPT | Performed by: NURSE PRACTITIONER

## 2020-10-09 PROCEDURE — 3075F SYST BP GE 130 - 139MM HG: CPT | Performed by: NURSE PRACTITIONER

## 2020-10-09 PROCEDURE — 3008F BODY MASS INDEX DOCD: CPT | Performed by: NURSE PRACTITIONER

## 2020-10-09 PROCEDURE — 3078F DIAST BP <80 MM HG: CPT | Performed by: NURSE PRACTITIONER

## 2020-10-09 RX ORDER — CEPHALEXIN 500 MG/1
500 CAPSULE ORAL 2 TIMES DAILY
Qty: 14 CAPSULE | Refills: 0 | Status: SHIPPED | OUTPATIENT
Start: 2020-10-09 | End: 2020-10-16

## 2020-10-09 NOTE — PROGRESS NOTES
CHIEF COMPLAINT:   Patient presents with:  UTI: urge to urinate, cloudy, frequent urination x 2 wks       HPI:   Darrian Grant is a 76year old female who presents with symptoms of UTI.  Complaining of urinary frequency, urgency, dysuria for last 2 w denies chest pain or palpitations  LUNGS: denies shortness of breath, cough, or wheezing  GI: See HPI. No N/V/C/D. : See HPI. NEURO: no headaches.     EXAM:   /77   Pulse 100   Temp 98.2 °F (36.8 °C)   Resp 16   Ht 61\"   Wt 151 lb (68.5 kg)   Sp of medication discussed. Encouraged patient to drink plenty of fluids. Urine sent for culture.  Prior to leaving the clinic, explained to patient that there will be additional costs sent to her insurance with the urine culture and pt agreed to sending th

## 2020-10-09 NOTE — PATIENT INSTRUCTIONS
· Complete full course of antibiotics. · Follow up in 2-3 days if symptoms do not improve or worsen. · Return to clinic or see your primary care provider immediately if you experience nausea, vomiting, or fever.    What can you do to help prevent bladde

## 2021-01-04 ENCOUNTER — PATIENT MESSAGE (OUTPATIENT)
Dept: FAMILY MEDICINE CLINIC | Facility: CLINIC | Age: 76
End: 2021-01-04

## 2021-01-04 NOTE — TELEPHONE ENCOUNTER
wanted to confirm receipt of email and attachment. Stated LE has helped before in this issue. Please advise  if anything is needed.     Also in case of denial [employer] (his old employer) is starting paperwork and may contact the office for info

## 2021-01-05 NOTE — TELEPHONE ENCOUNTER
Dr. Vega Florentino, you did an appeal for pt for out of network services in September. Ok to copy that letter and use for continuing services this year? Do you still agree pt should continue with current services?  Thank you, Nay TORRES

## 2021-01-06 NOTE — TELEPHONE ENCOUNTER
Fax recd from [facility] / [employer] [address] [facility] in regards to speech therapy. Was indicated in message on 1/4 that [employer] may contact us for info. Placed fax/form in LE triage folder.

## 2021-01-11 ENCOUNTER — PATIENT MESSAGE (OUTPATIENT)
Dept: FAMILY MEDICINE CLINIC | Facility: CLINIC | Age: 76
End: 2021-01-11

## 2021-01-11 NOTE — TELEPHONE ENCOUNTER
From: Sadi Henry  To: Bronson La DO  Sent: 1/11/2021 11:49 AM CST  Subject: Referral Request    On 1/4 we sent a message with attachment seeking Dr. Sheryle Groom support for an appeal being sent to [facility]. Human denied speech therapy sessions.  We

## 2021-01-12 NOTE — TELEPHONE ENCOUNTER
Dr. Halley Barry to pt  earlier today. Under communications in this message, he did not think the first letter I gave was acceptable (it was an updated version of the one from Sept. 2020).      He drafted his own letter and asked if we could

## 2021-01-12 NOTE — TELEPHONE ENCOUNTER
Forms have been completed and letter drafted to go to [facility]/[employer]. They are in LE office for signature. Will notify  when complete and get copy to him via fax/mail.

## 2021-01-12 NOTE — TELEPHONE ENCOUNTER
Spoke with . In addition to [employer] paperwork, also needed the letter to go to Fifi Elkins that states Dr Eleni Claude supports pt staying with current PT. Sent copy of that letter. Will fax info to [facility]/[employer] and will mail copy to patient.

## 2021-01-19 ENCOUNTER — TELEPHONE (OUTPATIENT)
Dept: FAMILY MEDICINE CLINIC | Facility: CLINIC | Age: 76
End: 2021-01-19

## 2021-01-19 NOTE — TELEPHONE ENCOUNTER
Form was already completed and sent to [facility] [facility]. (see last few pages of pt document he sent).

## 2021-01-19 NOTE — TELEPHONE ENCOUNTER
Patient's  wants to know if there is any reason for patient to not receive the covid vaccine? [id number] [address] for covid vaccine?

## 2021-02-03 ENCOUNTER — IMMUNIZATION (OUTPATIENT)
Dept: LAB | Age: 76
End: 2021-02-03

## 2021-02-03 DIAGNOSIS — Z23 NEED FOR VACCINATION: Primary | ICD-10-CM

## 2021-02-03 PROCEDURE — 91301 COVID-19 MODERNA VACCINE: CPT

## 2021-02-03 PROCEDURE — 0011A COVID-19 MODERNA VACCINE: CPT

## 2021-03-03 ENCOUNTER — IMMUNIZATION (OUTPATIENT)
Dept: LAB | Age: 76
End: 2021-03-03

## 2021-03-03 DIAGNOSIS — Z23 NEED FOR VACCINATION: Primary | ICD-10-CM

## 2021-03-03 PROCEDURE — 0012A COVID-19 MODERNA VACCINE: CPT | Performed by: HOSPITALIST

## 2021-03-03 PROCEDURE — 91301 COVID-19 MODERNA VACCINE: CPT | Performed by: HOSPITALIST

## 2021-03-04 ENCOUNTER — HOSPITAL ENCOUNTER (EMERGENCY)
Facility: HOSPITAL | Age: 76
Discharge: LEFT BEFORE DISCHARGE | End: 2021-03-05
Attending: EMERGENCY MEDICINE
Payer: MEDICARE

## 2021-03-04 ENCOUNTER — APPOINTMENT (OUTPATIENT)
Dept: CT IMAGING | Facility: HOSPITAL | Age: 76
End: 2021-03-04
Attending: EMERGENCY MEDICINE
Payer: MEDICARE

## 2021-03-04 DIAGNOSIS — E78.5 HYPERLIPIDEMIA, UNSPECIFIED HYPERLIPIDEMIA TYPE: ICD-10-CM

## 2021-03-04 DIAGNOSIS — R47.01 APHASIA: ICD-10-CM

## 2021-03-04 DIAGNOSIS — I10 BENIGN ESSENTIAL HTN: ICD-10-CM

## 2021-03-04 DIAGNOSIS — R73.9 HYPERGLYCEMIA: ICD-10-CM

## 2021-03-04 DIAGNOSIS — E55.9 VITAMIN D DEFICIENCY: ICD-10-CM

## 2021-03-04 DIAGNOSIS — I63.9 ACUTE CVA (CEREBROVASCULAR ACCIDENT) (HCC): Primary | ICD-10-CM

## 2021-03-04 LAB
ALBUMIN SERPL-MCNC: 3.6 G/DL (ref 3.4–5)
ALBUMIN/GLOB SERPL: 0.9 {RATIO} (ref 1–2)
ALP LIVER SERPL-CCNC: 78 U/L
ALT SERPL-CCNC: 25 U/L
ANION GAP SERPL CALC-SCNC: 8 MMOL/L (ref 0–18)
APTT PPP: 32.2 SECONDS (ref 25.4–36.1)
AST SERPL-CCNC: 26 U/L (ref 15–37)
BASOPHILS # BLD AUTO: 0.05 X10(3) UL (ref 0–0.2)
BASOPHILS NFR BLD AUTO: 0.7 %
BILIRUB SERPL-MCNC: 0.5 MG/DL (ref 0.1–2)
BUN BLD-MCNC: 14 MG/DL (ref 7–18)
BUN/CREAT SERPL: 14.1 (ref 10–20)
CALCIUM BLD-MCNC: 10 MG/DL (ref 8.5–10.1)
CHLORIDE SERPL-SCNC: 107 MMOL/L (ref 98–112)
CHOLEST SMN-MCNC: 143 MG/DL (ref ?–200)
CO2 SERPL-SCNC: 24 MMOL/L (ref 21–32)
CREAT BLD-MCNC: 0.99 MG/DL
DEPRECATED RDW RBC AUTO: 46.8 FL (ref 35.1–46.3)
EOSINOPHIL # BLD AUTO: 0.1 X10(3) UL (ref 0–0.7)
EOSINOPHIL NFR BLD AUTO: 1.5 %
ERYTHROCYTE [DISTWIDTH] IN BLOOD BY AUTOMATED COUNT: 14.7 % (ref 11–15)
GLOBULIN PLAS-MCNC: 3.9 G/DL (ref 2.8–4.4)
GLUCOSE BLD-MCNC: 122 MG/DL (ref 70–99)
GLUCOSE BLD-MCNC: 133 MG/DL (ref 70–99)
HCT VFR BLD AUTO: 45.5 %
HDLC SERPL-MCNC: 66 MG/DL (ref 40–59)
HGB BLD-MCNC: 14.9 G/DL
IMM GRANULOCYTES # BLD AUTO: 0.03 X10(3) UL (ref 0–1)
IMM GRANULOCYTES NFR BLD: 0.4 %
INR BLD: 0.98 (ref 0.89–1.11)
LDLC SERPL CALC-MCNC: 58 MG/DL (ref ?–100)
LYMPHOCYTES # BLD AUTO: 1.12 X10(3) UL (ref 1–4)
LYMPHOCYTES NFR BLD AUTO: 16.5 %
M PROTEIN MFR SERPL ELPH: 7.5 G/DL (ref 6.4–8.2)
MCH RBC QN AUTO: 28.4 PG (ref 26–34)
MCHC RBC AUTO-ENTMCNC: 32.7 G/DL (ref 31–37)
MCV RBC AUTO: 86.7 FL
MONOCYTES # BLD AUTO: 0.74 X10(3) UL (ref 0.1–1)
MONOCYTES NFR BLD AUTO: 10.9 %
NEUTROPHILS # BLD AUTO: 4.76 X10 (3) UL (ref 1.5–7.7)
NEUTROPHILS # BLD AUTO: 4.76 X10(3) UL (ref 1.5–7.7)
NEUTROPHILS NFR BLD AUTO: 70 %
NONHDLC SERPL-MCNC: 77 MG/DL (ref ?–130)
OSMOLALITY SERPL CALC.SUM OF ELEC: 290 MOSM/KG (ref 275–295)
PLATELET # BLD AUTO: 220 10(3)UL (ref 150–450)
POTASSIUM SERPL-SCNC: 4 MMOL/L (ref 3.5–5.1)
PSA SERPL DL<=0.01 NG/ML-MCNC: 13.3 SECONDS (ref 12.4–14.6)
RBC # BLD AUTO: 5.25 X10(6)UL
SARS-COV-2 RNA RESP QL NAA+PROBE: NOT DETECTED
SODIUM SERPL-SCNC: 139 MMOL/L (ref 136–145)
TRIGL SERPL-MCNC: 96 MG/DL (ref 30–149)
VIT D+METAB SERPL-MCNC: 33.8 NG/ML (ref 30–100)
VLDLC SERPL CALC-MCNC: 19 MG/DL (ref 0–30)
WBC # BLD AUTO: 6.8 X10(3) UL (ref 4–11)

## 2021-03-04 PROCEDURE — 99285 EMERGENCY DEPT VISIT HI MDM: CPT | Performed by: HOSPITALIST

## 2021-03-04 PROCEDURE — 70496 CT ANGIOGRAPHY HEAD: CPT | Performed by: EMERGENCY MEDICINE

## 2021-03-04 PROCEDURE — 70498 CT ANGIOGRAPHY NECK: CPT | Performed by: EMERGENCY MEDICINE

## 2021-03-04 PROCEDURE — 70450 CT HEAD/BRAIN W/O DYE: CPT | Performed by: EMERGENCY MEDICINE

## 2021-03-04 RX ORDER — DOCUSATE SODIUM 100 MG/1
100 CAPSULE, LIQUID FILLED ORAL 2 TIMES DAILY PRN
Status: CANCELLED | OUTPATIENT
Start: 2021-03-04

## 2021-03-04 RX ORDER — ONDANSETRON 2 MG/ML
4 INJECTION INTRAMUSCULAR; INTRAVENOUS EVERY 6 HOURS PRN
Status: CANCELLED | OUTPATIENT
Start: 2021-03-04

## 2021-03-04 RX ORDER — ACETAMINOPHEN 325 MG/1
650 TABLET ORAL EVERY 6 HOURS PRN
Status: CANCELLED | OUTPATIENT
Start: 2021-03-04

## 2021-03-04 RX ORDER — LABETALOL HYDROCHLORIDE 5 MG/ML
10 INJECTION, SOLUTION INTRAVENOUS EVERY 10 MIN PRN
Status: CANCELLED | OUTPATIENT
Start: 2021-03-04

## 2021-03-04 RX ORDER — BISACODYL 10 MG
10 SUPPOSITORY, RECTAL RECTAL
Status: CANCELLED | OUTPATIENT
Start: 2021-03-04

## 2021-03-04 RX ORDER — ONDANSETRON 2 MG/ML
8 INJECTION INTRAMUSCULAR; INTRAVENOUS EVERY 6 HOURS PRN
Status: CANCELLED | OUTPATIENT
Start: 2021-03-04

## 2021-03-04 RX ORDER — ATORVASTATIN CALCIUM 40 MG/1
40 TABLET, FILM COATED ORAL NIGHTLY
Status: CANCELLED | OUTPATIENT
Start: 2021-03-04

## 2021-03-04 RX ORDER — SODIUM CHLORIDE 9 MG/ML
INJECTION, SOLUTION INTRAVENOUS CONTINUOUS
Status: CANCELLED | OUTPATIENT
Start: 2021-03-04 | End: 2021-03-05

## 2021-03-04 RX ORDER — ASPIRIN 325 MG
325 TABLET ORAL DAILY
Status: CANCELLED | OUTPATIENT
Start: 2021-03-04

## 2021-03-04 RX ORDER — POLYETHYLENE GLYCOL 3350 17 G/17G
17 POWDER, FOR SOLUTION ORAL DAILY PRN
Status: CANCELLED | OUTPATIENT
Start: 2021-03-04

## 2021-03-04 RX ORDER — MELATONIN
3 NIGHTLY PRN
Status: CANCELLED | OUTPATIENT
Start: 2021-03-04

## 2021-03-04 RX ORDER — HYDRALAZINE HYDROCHLORIDE 20 MG/ML
10 INJECTION INTRAMUSCULAR; INTRAVENOUS EVERY 2 HOUR PRN
Status: CANCELLED | OUTPATIENT
Start: 2021-03-04

## 2021-03-04 RX ORDER — METOCLOPRAMIDE HYDROCHLORIDE 5 MG/ML
10 INJECTION INTRAMUSCULAR; INTRAVENOUS EVERY 8 HOURS PRN
Status: CANCELLED | OUTPATIENT
Start: 2021-03-04

## 2021-03-04 RX ORDER — ACETAMINOPHEN 650 MG/1
650 SUPPOSITORY RECTAL EVERY 4 HOURS PRN
Status: CANCELLED | OUTPATIENT
Start: 2021-03-04

## 2021-03-04 RX ORDER — ONDANSETRON 2 MG/ML
4 INJECTION INTRAMUSCULAR; INTRAVENOUS EVERY 4 HOURS PRN
Status: CANCELLED | OUTPATIENT
Start: 2021-03-04 | End: 2021-03-05

## 2021-03-04 RX ORDER — ASPIRIN 300 MG
300 SUPPOSITORY, RECTAL RECTAL DAILY
Status: CANCELLED | OUTPATIENT
Start: 2021-03-04

## 2021-03-04 RX ORDER — ENOXAPARIN SODIUM 100 MG/ML
40 INJECTION SUBCUTANEOUS DAILY
Status: CANCELLED | OUTPATIENT
Start: 2021-03-05

## 2021-03-04 RX ORDER — ASPIRIN 300 MG
300 SUPPOSITORY, RECTAL RECTAL ONCE
Status: COMPLETED | OUTPATIENT
Start: 2021-03-04 | End: 2021-03-04

## 2021-03-04 RX ORDER — ACETAMINOPHEN 325 MG/1
650 TABLET ORAL EVERY 4 HOURS PRN
Status: CANCELLED | OUTPATIENT
Start: 2021-03-04

## 2021-03-04 RX ORDER — SODIUM CHLORIDE 9 MG/ML
INJECTION, SOLUTION INTRAVENOUS CONTINUOUS
Status: CANCELLED | OUTPATIENT
Start: 2021-03-04 | End: 2021-03-06

## 2021-03-05 VITALS
OXYGEN SATURATION: 99 % | BODY MASS INDEX: 30 KG/M2 | RESPIRATION RATE: 17 BRPM | HEART RATE: 90 BPM | SYSTOLIC BLOOD PRESSURE: 128 MMHG | DIASTOLIC BLOOD PRESSURE: 64 MMHG | WEIGHT: 159.38 LBS

## 2021-03-05 DIAGNOSIS — Z23 NEED FOR VACCINATION: ICD-10-CM

## 2021-03-05 LAB
EST. AVERAGE GLUCOSE BLD GHB EST-MCNC: 137 MG/DL (ref 68–126)
HBA1C MFR BLD HPLC: 6.4 % (ref ?–5.7)

## 2021-03-05 NOTE — ED INITIAL ASSESSMENT (HPI)
Patient arrives from home per EMS with c/o aphasia, right sided weakness- 90 min ago onset. Hx.  Previous stroke with right sided weakness

## 2021-03-05 NOTE — ED PROVIDER NOTES
Patient Seen in: [facility] Emergency Department      History   Patient presents with:  Stroke    Stated Complaint:     HPI/Subjective:   HPI       14-year-old female with a previous history of hypertension, hyperlipidemia, prior history of a stroke Finalized by: Judy Méndez MD on 3/27/2020 at 10:43 PM       Objective:   Past Medical History:   Diagnosis Date   • Atrial septal aneurysm 2009   • DJD (degenerative joint disease)     knee   • High cholesterol     Hyperlipidemia   • Hyperlipidemia 4 rubs, gallops. Lungs: Clear to auscultation bilaterally. There is no audible wheezes, Rales, rhonchi. Abdomen: Soft, nontender, nondistended. There is bowel sounds throughout 4 quadrants. There is no guarding or rebound tenderness. Extremities:  There Abnormal            Final result                 Please view results for these tests on the individual orders.    RAINBOW DRAW BLUE   RAINBOW DRAW LAVENDER   RAINBOW DRAW LIGHT GREEN   RAINBOW DRAW GOLD             CT STROKE CTA BRAIN/CTA NECK (W IV)(CPT=70 quickly and patient is now able to move her right arm and her right leg. Require repeat neuro checks to ensure there is no clinical deterioration of her neurologic status. Patient will be admitted to cardiac telemetry floor.   Patient admitted to THE [facility]

## 2021-03-05 NOTE — H&P
DAVID HOSPITALIST  History and Physical     Alejandro Dukes Patient Status:  Emergency    1945 MRN [id number]   Location [address] [facility] Attending Natalie Pro MD   Hosp Day # 0 PCP Catalino Lemus DO     Chief Complaint: Christina Clap ca   • Other (Leukemia) Other         aunt   • Breast Cancer Other         aunt   • Heart Disorder Maternal Grandmother    • Obesity Brother         Allergies:   Bee Venom                   Comment:\"bee sting\"    Medications:  No current facility-adminis hours.    Imaging: Imaging data reviewed in Epic. ASSESSMENT / PLAN:     1. Acute on chronic exphasia and right sided weakness; now improving in ER. NIH went from 18 to 5.  thinks that patient back to near baseline at time of this writing.   Ana M Armijo

## 2021-03-06 ENCOUNTER — APPOINTMENT (OUTPATIENT)
Dept: CT IMAGING | Facility: HOSPITAL | Age: 76
End: 2021-03-06
Attending: EMERGENCY MEDICINE
Payer: MEDICARE

## 2021-03-06 ENCOUNTER — HOSPITAL ENCOUNTER (OUTPATIENT)
Facility: HOSPITAL | Age: 76
Setting detail: OBSERVATION
Discharge: HOME | End: 2021-03-07
Attending: HOSPITALIST | Admitting: HOSPITALIST
Payer: MEDICARE

## 2021-03-06 ENCOUNTER — APPOINTMENT (OUTPATIENT)
Dept: MRI IMAGING | Facility: HOSPITAL | Age: 76
End: 2021-03-06
Attending: EMERGENCY MEDICINE
Payer: MEDICARE

## 2021-03-06 ENCOUNTER — PATIENT MESSAGE (OUTPATIENT)
Dept: FAMILY MEDICINE CLINIC | Facility: CLINIC | Age: 76
End: 2021-03-06

## 2021-03-06 DIAGNOSIS — G45.9 TIA (TRANSIENT ISCHEMIC ATTACK): Primary | ICD-10-CM

## 2021-03-06 LAB
CHOLEST SMN-MCNC: 133 MG/DL (ref ?–200)
GLUCOSE BLD-MCNC: 89 MG/DL (ref 70–99)
HDLC SERPL-MCNC: 62 MG/DL (ref 40–59)
LDLC SERPL CALC-MCNC: 46 MG/DL (ref ?–100)
NONHDLC SERPL-MCNC: 71 MG/DL (ref ?–130)
TRIGL SERPL-MCNC: 126 MG/DL (ref 30–149)
VLDLC SERPL CALC-MCNC: 25 MG/DL (ref 0–30)

## 2021-03-06 PROCEDURE — 99220 INITIAL OBSERVATION CARE,LEVL III: CPT | Performed by: HOSPITALIST

## 2021-03-06 PROCEDURE — 70553 MRI BRAIN STEM W/O & W/DYE: CPT | Performed by: EMERGENCY MEDICINE

## 2021-03-06 RX ORDER — ATORVASTATIN CALCIUM 80 MG/1
80 TABLET, FILM COATED ORAL NIGHTLY
Status: DISCONTINUED | OUTPATIENT
Start: 2021-03-06 | End: 2021-03-07

## 2021-03-06 RX ORDER — LABETALOL HYDROCHLORIDE 5 MG/ML
10 INJECTION, SOLUTION INTRAVENOUS EVERY 10 MIN PRN
Status: DISCONTINUED | OUTPATIENT
Start: 2021-03-06 | End: 2021-03-07

## 2021-03-06 RX ORDER — ONDANSETRON 2 MG/ML
8 INJECTION INTRAMUSCULAR; INTRAVENOUS EVERY 6 HOURS PRN
Status: DISCONTINUED | OUTPATIENT
Start: 2021-03-06 | End: 2021-03-07

## 2021-03-06 RX ORDER — ASPIRIN 300 MG
300 SUPPOSITORY, RECTAL RECTAL DAILY
Status: DISCONTINUED | OUTPATIENT
Start: 2021-03-06 | End: 2021-03-07

## 2021-03-06 RX ORDER — SODIUM CHLORIDE 9 MG/ML
INJECTION, SOLUTION INTRAVENOUS CONTINUOUS
Status: DISCONTINUED | OUTPATIENT
Start: 2021-03-06 | End: 2021-03-07

## 2021-03-06 RX ORDER — POLYETHYLENE GLYCOL 3350 17 G/17G
17 POWDER, FOR SOLUTION ORAL DAILY PRN
Status: DISCONTINUED | OUTPATIENT
Start: 2021-03-06 | End: 2021-03-07

## 2021-03-06 RX ORDER — ENOXAPARIN SODIUM 100 MG/ML
40 INJECTION SUBCUTANEOUS DAILY
Status: DISCONTINUED | OUTPATIENT
Start: 2021-03-06 | End: 2021-03-07

## 2021-03-06 RX ORDER — HYDRALAZINE HYDROCHLORIDE 20 MG/ML
10 INJECTION INTRAMUSCULAR; INTRAVENOUS EVERY 2 HOUR PRN
Status: DISCONTINUED | OUTPATIENT
Start: 2021-03-06 | End: 2021-03-07

## 2021-03-06 RX ORDER — MELATONIN
3 NIGHTLY PRN
Status: DISCONTINUED | OUTPATIENT
Start: 2021-03-06 | End: 2021-03-07

## 2021-03-06 RX ORDER — ONDANSETRON 2 MG/ML
4 INJECTION INTRAMUSCULAR; INTRAVENOUS EVERY 6 HOURS PRN
Status: DISCONTINUED | OUTPATIENT
Start: 2021-03-06 | End: 2021-03-07

## 2021-03-06 RX ORDER — ACETAMINOPHEN 325 MG/1
650 TABLET ORAL EVERY 6 HOURS PRN
Status: DISCONTINUED | OUTPATIENT
Start: 2021-03-06 | End: 2021-03-07

## 2021-03-06 RX ORDER — ASPIRIN 325 MG
325 TABLET ORAL DAILY
Status: DISCONTINUED | OUTPATIENT
Start: 2021-03-06 | End: 2021-03-07

## 2021-03-06 RX ORDER — ACETAMINOPHEN 650 MG/1
650 SUPPOSITORY RECTAL EVERY 4 HOURS PRN
Status: DISCONTINUED | OUTPATIENT
Start: 2021-03-06 | End: 2021-03-07

## 2021-03-06 RX ORDER — METOCLOPRAMIDE HYDROCHLORIDE 5 MG/ML
10 INJECTION INTRAMUSCULAR; INTRAVENOUS EVERY 8 HOURS PRN
Status: DISCONTINUED | OUTPATIENT
Start: 2021-03-06 | End: 2021-03-07

## 2021-03-06 RX ORDER — BISACODYL 10 MG
10 SUPPOSITORY, RECTAL RECTAL
Status: DISCONTINUED | OUTPATIENT
Start: 2021-03-06 | End: 2021-03-07

## 2021-03-06 RX ORDER — DOCUSATE SODIUM 100 MG/1
100 CAPSULE, LIQUID FILLED ORAL 2 TIMES DAILY PRN
Status: DISCONTINUED | OUTPATIENT
Start: 2021-03-06 | End: 2021-03-07

## 2021-03-06 RX ORDER — ACETAMINOPHEN 325 MG/1
650 TABLET ORAL EVERY 4 HOURS PRN
Status: DISCONTINUED | OUTPATIENT
Start: 2021-03-06 | End: 2021-03-07

## 2021-03-06 NOTE — ED PROVIDER NOTES
Patient Seen in: [facility] Emergency Department      History   Patient presents with:  CVA    Stated Complaint: Stroke like symptoms, expressive aphasia, reduced sensory for 2 days    HPI/Subjective:   HPI    Patient with a history of stroke in Mar air)       Current:/70 (BP Location: Left arm)   Pulse 85   Temp 97.5 °F (36.4 °C) (Oral)   Resp 15   Ht 160 cm (5' 3\")   Wt 68 kg   SpO2 95%   BMI 26.57 kg/m²         Physical Exam    General: Well-developed, well-nourished, comfortable, no acute d hours. At this moment, patient feels she is at her baseline. Case discussed with  who recommends MRI brain with and without since patient had CT and CTA yesterday. Patient will be admitted for observation, further evaluation and work-up.

## 2021-03-06 NOTE — ED INITIAL ASSESSMENT (HPI)
Patient arrives with ;  states that they were here Thursday for possible TIA. Patients  states that patient has been increasingly confused and unable to remember how to do complete tasks.  Patient had Mary Beth Laurie vaccination Wednesday and

## 2021-03-07 ENCOUNTER — APPOINTMENT (OUTPATIENT)
Dept: CV DIAGNOSTICS | Facility: HOSPITAL | Age: 76
End: 2021-03-07
Attending: HOSPITALIST
Payer: MEDICARE

## 2021-03-07 VITALS
RESPIRATION RATE: 19 BRPM | HEIGHT: 63 IN | OXYGEN SATURATION: 97 % | TEMPERATURE: 98 F | BODY MASS INDEX: 26.58 KG/M2 | HEART RATE: 87 BPM | DIASTOLIC BLOOD PRESSURE: 79 MMHG | SYSTOLIC BLOOD PRESSURE: 140 MMHG | WEIGHT: 150 LBS

## 2021-03-07 LAB
ANION GAP SERPL CALC-SCNC: 8 MMOL/L (ref 0–18)
ATRIAL RATE: 102 BPM
BASOPHILS # BLD AUTO: 0.04 X10(3) UL (ref 0–0.2)
BASOPHILS NFR BLD AUTO: 0.8 %
BUN BLD-MCNC: 16 MG/DL (ref 7–18)
BUN/CREAT SERPL: 25.4 (ref 10–20)
CALCIUM BLD-MCNC: 8.9 MG/DL (ref 8.5–10.1)
CHLORIDE SERPL-SCNC: 112 MMOL/L (ref 98–112)
CO2 SERPL-SCNC: 24 MMOL/L (ref 21–32)
CREAT BLD-MCNC: 0.63 MG/DL
DEPRECATED RDW RBC AUTO: 47.8 FL (ref 35.1–46.3)
EOSINOPHIL # BLD AUTO: 0.28 X10(3) UL (ref 0–0.7)
EOSINOPHIL NFR BLD AUTO: 5.5 %
ERYTHROCYTE [DISTWIDTH] IN BLOOD BY AUTOMATED COUNT: 14.7 % (ref 11–15)
GLUCOSE BLD-MCNC: 79 MG/DL (ref 70–99)
GLUCOSE BLD-MCNC: 93 MG/DL (ref 70–99)
GLUCOSE BLD-MCNC: 98 MG/DL (ref 70–99)
HAV IGM SER QL: 2.2 MG/DL (ref 1.6–2.6)
HCT VFR BLD AUTO: 42.4 %
HGB BLD-MCNC: 13.7 G/DL
IMM GRANULOCYTES # BLD AUTO: 0.01 X10(3) UL (ref 0–1)
IMM GRANULOCYTES NFR BLD: 0.2 %
LYMPHOCYTES # BLD AUTO: 1.59 X10(3) UL (ref 1–4)
LYMPHOCYTES NFR BLD AUTO: 31.1 %
MCH RBC QN AUTO: 28.5 PG (ref 26–34)
MCHC RBC AUTO-ENTMCNC: 32.3 G/DL (ref 31–37)
MCV RBC AUTO: 88.3 FL
MONOCYTES # BLD AUTO: 0.78 X10(3) UL (ref 0.1–1)
MONOCYTES NFR BLD AUTO: 15.2 %
NEUTROPHILS # BLD AUTO: 2.42 X10 (3) UL (ref 1.5–7.7)
NEUTROPHILS # BLD AUTO: 2.42 X10(3) UL (ref 1.5–7.7)
NEUTROPHILS NFR BLD AUTO: 47.2 %
OSMOLALITY SERPL CALC.SUM OF ELEC: 298 MOSM/KG (ref 275–295)
P AXIS: 39 DEGREES
P-R INTERVAL: 164 MS
PLATELET # BLD AUTO: 192 10(3)UL (ref 150–450)
POTASSIUM SERPL-SCNC: 3.7 MMOL/L (ref 3.5–5.1)
Q-T INTERVAL: 338 MS
QRS DURATION: 78 MS
QTC CALCULATION (BEZET): 440 MS
R AXIS: 6 DEGREES
RBC # BLD AUTO: 4.8 X10(6)UL
SODIUM SERPL-SCNC: 144 MMOL/L (ref 136–145)
T AXIS: 18 DEGREES
VENTRICULAR RATE: 102 BPM
WBC # BLD AUTO: 5.1 X10(3) UL (ref 4–11)

## 2021-03-07 PROCEDURE — 4A10X4Z MONITORING OF CENTRAL NERVOUS ELECTRICAL ACTIVITY, EXTERNAL APPROACH: ICD-10-PCS | Performed by: OTHER

## 2021-03-07 PROCEDURE — 99217 OBSERVATION CARE DISCHARGE: CPT | Performed by: HOSPITALIST

## 2021-03-07 PROCEDURE — 99214 OFFICE O/P EST MOD 30 MIN: CPT | Performed by: OTHER

## 2021-03-07 PROCEDURE — 93306 TTE W/DOPPLER COMPLETE: CPT | Performed by: HOSPITALIST

## 2021-03-07 NOTE — PLAN OF CARE
NURSING ADMISSION NOTE  Lotus Bridges  7/25/1945    Patient admitted via Cart  Oriented to room. Safety precautions initiated. Bed in low position. Call light in reach.   /67 (BP Location: Left arm)   Pulse 76   Temp 97.5 °F (36.4 °C) (Ora

## 2021-03-07 NOTE — PROCEDURES
[address] EEG report    Name: Phil Salazar    Date of Study: 3/7/2021      Routine EEG Report    Ordering Physician: Jose Rafael Sarah MD                               Primary Care Physician: Tammy Meza DO    Technical A WEDNESDAY AND HAD CHRONIC RT SIDED WEAKNESS, WENT TO ED THURSDAY, SYMPTOMS RESOLVED DAY OF SO PT LEFT AMA. EEG ORDERED TO R/O SZ ACTIVITY. PT STILL HAS EXPRESSIVE APHASIA BUT IS WALKING OKAY.    PMH: ATRIAL SEPTAL ANEURYSM, HTN, HLD,  RX: ATORVASTATIN, FAMO

## 2021-03-07 NOTE — PHYSICAL THERAPY NOTE
PHYSICAL THERAPY QUICK EVALUATION - INPATIENT    Room Number: 1397/4719-B  Evaluation Date: 3/7/2021  Presenting Problem: TIA  Physician Order: PT Eval and Treat    Problem List  Principal Problem:    TIA (transient ischemic attack)  Active Problems: functional limits     Lower extremity strength is within functional limits     NEUROLOGICAL FINDINGS                      ACTIVITY TOLERANCE                         O2 WALK                  AM-PAC '6-Clicks' INPATIENT SHORT FORM - BASIC MOBILITY  How much evaluated and presents with no skilled Physical Therapy needs at this time. Patient discharged from Physical Therapy services. Please re-order if a new functional limitation presents during this admission.     GOALS  Patient was able to achieve the follow

## 2021-03-07 NOTE — OCCUPATIONAL THERAPY NOTE
OCCUPATIONAL THERAPY QUICK EVALUATION - INPATIENT    Room Number: 3650/0434-F  Evaluation Date: 3/7/2021     Type of Evaluation: Quick Eval  Presenting Problem: enecephalomalacia     Physician Order: IP Consult to Occupational Therapy  Reason for Therapy: Pt and pt's spouse confirms that pt is typically IND with ADLs and mobility without any assistive devices. Pt. Pt does not drive and has history of aphasia from stroke in March 2020. SUBJECTIVE  \"I feel fine. \"    Patient self-stated goal is to go [place] history of aphasia. Pt and spouse verbalized pt appears at or close to baseline. Pt returned to semi-supine position in bed with all needs met, call light within reach, RN aware of session. Patient End of Session: In bed; With [facility] staff;Needs met; Ca spouse mask throughout session.

## 2021-03-07 NOTE — PROGRESS NOTES
DAVID HOSPITALIST  Progress Note     Wadell Decent Patient Status:  Observation    1945 MRN [id number]   [facility] 7NE-A Attending Jody Garcia MD   Hosp Day # 0 PCP José Jamesony, DO     Chief Complaint: increased confusion hours. Cardiac  No results for input(s): TROP, PBNP in the last 168 hours. Creatinine Kinase  No results for input(s): CK in the last 168 hours. Inflammatory Markers  No results for input(s): CRP, OLIVE, LDH, DDIMER in the last 168 hours.    Imaging:

## 2021-03-07 NOTE — SLP NOTE
ADULT SWALLOWING EVALUATION    ASSESSMENT    ASSESSMENT/OVERALL IMPRESSION:  Patient seen for bedside swallow evaluation per physician order. Patient currently on a regular diet with thin liquids.   RN reports patient tolerated breakfast tray of fresh frui understanding with suggested safe swallow strategies and aspiration precautions. Results and recommendations were discussed with RN as well. RECOMMENDATIONS   Diet Recommendations - Solids: Regular  Diet Recommendations - Liquid:  Thin region with associated changes of laminar necrosis. Stable minimal chronic   microvascular ischemic changes in the cerebral white matter. There is no acute intracranial hemorrhage or extra-axial fluid collection identified.   No significant abnormal p worn during evaluation session was surgical mask, goggles, gloves. Patient remained unmasked throughout session. Patient's  donned surgical mask, however, it was inconsistently placed above his nares.      Thank you for your referral.   If you have

## 2021-03-07 NOTE — PROGRESS NOTES
[id number] [facility] Neurology Preliminary Note    Annmarie Shoulder Patient Status:  Observation    1945 MRN [id number]   [facility] 7NE-A Attending Noe Awad MD   Hosp Day # 0 PCP Kayleen Null DO     REASON FOR EVALUATION: co SURGICAL HISTORY  1/11/2010    FNA, thyroid nodule       FAMILY HISTORY:  family history includes Breast Cancer in an other family member; CAD in her father; Cancer in her father and another family member; Heart Disorder in her maternal grandmother and mot positives and negatives are noted in HPI.       PHYSICAL EXAMINATION:  VITAL SIGNS: /73 (BP Location: Right arm)   Pulse 80   Temp 98.1 °F (36.7 °C) (Oral)   Resp 15   Ht 63\"   Wt 150 lb (68 kg)   SpO2 96%   BMI 26.57 kg/m²   GENERAL:  Patient is a [address]  3/7/2021, Darlin London [phone]

## 2021-03-07 NOTE — PLAN OF CARE
Assumed care of patient at 0700  A/Ox4, expressive aphasia at baseline, RA, NSR on tele  Q4 neuros, no new deficits  EEG results abnormal d/t semi-continuous focal slowing over the left hemisphere, maximal over the left frontotemporal region  Echo EF 60-65 devices  Outcome: Adequate for Discharge     Problem: Impaired Cognition  Goal: Patient will exhibit improved attention, thought processing and/or memory  Description: Interventions:    Outcome: Adequate for Discharge     Problem: Impaired Communication  G

## 2021-03-07 NOTE — H&P
DAVID HOSPITALIST  History and Physical     Alejandro Dukes Patient Status:  Emergency    1945 MRN [id number]   Location [address] [facility] Attending Carolyn Sepulveda, [address] [facility] Day # 0 PCP Catalino Lemus DO     Chief Complain History   Problem Relation Age of Onset   • High Cholesterol Father    • Cancer Father         gastric ca, father's side   • Other (CAD) Father    • Stroke Mother    • Heart Disorder Mother    • Obesity Mother    • Cancer Other         ovarian ca   • Other 6. 8   HGB 14.9   MCV 86.7   .0   INR 0.98       Recent Labs   Lab 03/04/21  2236   *   BUN 14   CREATSERUM 0.99   GFRAA 64   GFRNAA 56*   CA 10.0   ALB 3.6      K 4.0      CO2 24.0   ALKPHO 78   AST 26   ALT 25   BILT 0.5   TP 7.5

## 2021-03-07 NOTE — CONSULTS
[id number] [facility] Neurology Initial Consultation    Port[place] Spina Patient Status:  Observation    1945 MRN [id number]   [facility] 7NE-A Attending Lenny Albright MD   Hosp Day # 0 PCP Bello Hugo DO     REASON FOR EVALUATION HISTORY:  Past Surgical History:   Procedure Laterality Date   • KNEE REPLACEMENT SURGERY  2010    L knee unicompartment replacement, unicondylar knee replacement   •      • OTHER  5/22/15    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 MAGNESIA) 400 MG/5ML suspension 30 mL, 30 mL, Oral, Daily PRN  bisacodyl (DULCOLAX) rectal suppository 10 mg, 10 mg, Rectal, Daily PRN  ondansetron HCl (ZOFRAN) injection 8 mg, 8 mg, Intravenous, Q6H PRN        REVIEW OF SYSTEMS:  A 10-point system was rev from small to medium-sized old infarct in the left MCA territory along the superior left temporal lobe, lateral left parietal lobe, and left insular region with associated changes of laminar necrosis.  Stable minimal chronic microvascular ischemic changes photic stimulation were not performed      Epileptiform activity: None     Seizures: none     Events: none            Impression:    This EEG is abnormal due to semi-continuous focal slowing over the left hemisphere, maximal over the left frontotemporal reg immediately to the emergency room should she experience any symptoms including, but not limited to the following: sudden onset of slurred speech, numbness/tingling/weakness on one side of the body, confusion, double vision, loss of vision, vertigo,     edu

## 2021-03-07 NOTE — SLP NOTE
Order received for bedside swallow evaluation. Spoke with RN and she reports pt tolerated her breakfast this morning without evidence of difficulty. Upon SLP arrival to patient's room she was noted to be occupied with echo.   RN aware of SLP attempt and p

## 2021-03-08 ENCOUNTER — TELEPHONE (OUTPATIENT)
Dept: NEUROLOGY | Facility: CLINIC | Age: 76
End: 2021-03-08

## 2021-03-08 ENCOUNTER — OFFICE VISIT (OUTPATIENT)
Dept: FAMILY MEDICINE CLINIC | Facility: CLINIC | Age: 76
End: 2021-03-08
Payer: MEDICARE

## 2021-03-08 ENCOUNTER — PATIENT OUTREACH (OUTPATIENT)
Dept: CASE MANAGEMENT | Age: 76
End: 2021-03-08

## 2021-03-08 VITALS
WEIGHT: 154 LBS | OXYGEN SATURATION: 98 % | SYSTOLIC BLOOD PRESSURE: 124 MMHG | RESPIRATION RATE: 16 BRPM | HEIGHT: 63 IN | BODY MASS INDEX: 27.29 KG/M2 | DIASTOLIC BLOOD PRESSURE: 70 MMHG | HEART RATE: 97 BPM

## 2021-03-08 DIAGNOSIS — Z02.9 ENCOUNTERS FOR UNSPECIFIED ADMINISTRATIVE PURPOSE: ICD-10-CM

## 2021-03-08 DIAGNOSIS — Z12.11 SCREENING FOR COLORECTAL CANCER: ICD-10-CM

## 2021-03-08 DIAGNOSIS — Z12.12 SCREENING FOR COLORECTAL CANCER: ICD-10-CM

## 2021-03-08 DIAGNOSIS — G45.9 TIA (TRANSIENT ISCHEMIC ATTACK): Primary | ICD-10-CM

## 2021-03-08 PROCEDURE — 3008F BODY MASS INDEX DOCD: CPT | Performed by: FAMILY MEDICINE

## 2021-03-08 PROCEDURE — 3078F DIAST BP <80 MM HG: CPT | Performed by: FAMILY MEDICINE

## 2021-03-08 PROCEDURE — 99214 OFFICE O/P EST MOD 30 MIN: CPT | Performed by: FAMILY MEDICINE

## 2021-03-08 PROCEDURE — 3074F SYST BP LT 130 MM HG: CPT | Performed by: FAMILY MEDICINE

## 2021-03-08 PROCEDURE — 1111F DSCHRG MED/CURRENT MED MERGE: CPT | Performed by: FAMILY MEDICINE

## 2021-03-08 NOTE — PROGRESS NOTES
[facility] Progress Note    SUBJECTIVE: Rosario Cortes 76year old female is here today for Patient presents with:  ER F/U: TIA      Follow up on TIA, goal is to make sure not to have a relapse of stroke, which has a history of a year or more REPLACEMENT SURGERY  2010    L knee unicompartment replacement, unicondylar knee replacement   •      • OTHER  5/22/15    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   • OTHER SURGICAL HISTORY  2010    FNA, thyroid nodule        So

## 2021-03-08 NOTE — TELEPHONE ENCOUNTER
nurse with Dr Dominick Worthington office states pt cannot come in this week for appt and needs to be seen in two weeks for a TIA; next available is end of April; pls advise

## 2021-03-08 NOTE — DISCHARGE SUMMARY
[facility] HOSPITALIST  DISCHARGE SUMMARY     Hema Kamara Patient Status:  Observation    1945 MRN [id number]   [facility] 7NE-A Attending No att. providers found   Hosp Day # 0 PCP Richa Carmen DO     Date of Admission: 3/6/2021 of the brain showed no acute findings. There was consideration that this was a side effect of the second vaccination which was transient in nature as symptoms resolved.   Patient was cleared by neurology and discharged in good condition    Procedures carson

## 2021-03-08 NOTE — TELEPHONE ENCOUNTER
From: Mary Sandoval  To: Beth Franco DO  Sent: 3/6/2021 7:16 AM CST  Subject: Visit Follow-up Question    Sherlyn was seen in the emergency room Thursday night. Diagnosis was TIA Stroke. We tried to get in to see you but no available time until 3/17.  Ana M Augustine

## 2021-03-08 NOTE — TELEPHONE ENCOUNTER
TIA CLINIC SCREENING    1. Date of ED visit/TIA diagnosis:  3/4/2020   Time of discharge from ED:  n/a    2. Is patient currently admitted? Yes   If YES - TIA Clinic Appointment not required.       3. Does patient already see an [facility] neurologist?  Yes  Name

## 2021-03-09 NOTE — TELEPHONE ENCOUNTER
Patient in to ER and dx with TIA on 3/6/2021. Also per Epic review 3/4/2021 in ER. Followed up with PCP on 3/8/2021. Per PCP needs follow up with Dr. Madeline Tran in 2 weeks. Routing to Dr. Madeline Tran.     LOV on on 9/17/2020 - Stroke Follow up from 3/31

## 2021-03-09 NOTE — TELEPHONE ENCOUNTER
Spoke with patient's  who states cannot come in on the 18th and was wondering if Dr Milan Sexton could see her the week of 3/22? Patient is available at any time that week.

## 2021-03-09 NOTE — TELEPHONE ENCOUNTER
Scott't scheduled 3/24/21 @ 3:00 in [place].  [employer] message sent with scott't time and address per  request.

## 2021-03-11 ENCOUNTER — TELEPHONE (OUTPATIENT)
Dept: FAMILY MEDICINE CLINIC | Facility: CLINIC | Age: 76
End: 2021-03-11

## 2021-03-11 DIAGNOSIS — G45.9 TIA (TRANSIENT ISCHEMIC ATTACK): Primary | ICD-10-CM

## 2021-03-12 NOTE — TELEPHONE ENCOUNTER
Ok to fax order, I think we start with 12 more visits.  May need to push for this, but with recent TIA and possible worsening, I think it is appropriate    Malrena Briones MD

## 2021-03-12 NOTE — TELEPHONE ENCOUNTER
Spoke to , would like to get orders for additional speech for her recent TIA    Rafa Villa at [facility]     Fax [phone], [facility], [address] [facility] [address]   [phone] to fax to order? How many additional visits?

## 2021-03-25 ENCOUNTER — PATIENT OUTREACH (OUTPATIENT)
Dept: CASE MANAGEMENT | Age: 76
End: 2021-03-25

## 2021-05-27 ENCOUNTER — OFFICE VISIT (OUTPATIENT)
Dept: FAMILY MEDICINE CLINIC | Facility: CLINIC | Age: 76
End: 2021-05-27
Payer: MEDICARE

## 2021-05-27 VITALS
DIASTOLIC BLOOD PRESSURE: 80 MMHG | WEIGHT: 154 LBS | RESPIRATION RATE: 18 BRPM | BODY MASS INDEX: 27.29 KG/M2 | OXYGEN SATURATION: 98 % | HEIGHT: 63 IN | SYSTOLIC BLOOD PRESSURE: 120 MMHG | HEART RATE: 94 BPM

## 2021-05-27 DIAGNOSIS — L71.9 ACNE ROSACEA: ICD-10-CM

## 2021-05-27 DIAGNOSIS — F09 COGNITIVE DISORDER: ICD-10-CM

## 2021-05-27 DIAGNOSIS — R53.83 OTHER FATIGUE: ICD-10-CM

## 2021-05-27 DIAGNOSIS — I10 ESSENTIAL HYPERTENSION: ICD-10-CM

## 2021-05-27 DIAGNOSIS — Z86.73 HISTORY OF ARTERIAL ISCHEMIC STROKE: ICD-10-CM

## 2021-05-27 DIAGNOSIS — E55.9 VITAMIN D DEFICIENCY: ICD-10-CM

## 2021-05-27 DIAGNOSIS — I63.412 CEREBROVASCULAR ACCIDENT (CVA) DUE TO EMBOLISM OF LEFT MIDDLE CEREBRAL ARTERY (HCC): ICD-10-CM

## 2021-05-27 DIAGNOSIS — E78.5 DYSLIPIDEMIA: ICD-10-CM

## 2021-05-27 DIAGNOSIS — I87.2 VENOUS INSUFFICIENCY: ICD-10-CM

## 2021-05-27 DIAGNOSIS — E04.9 GOITER: ICD-10-CM

## 2021-05-27 DIAGNOSIS — G45.9 TIA (TRANSIENT ISCHEMIC ATTACK): ICD-10-CM

## 2021-05-27 DIAGNOSIS — Z00.00 ENCOUNTER FOR ANNUAL HEALTH EXAMINATION: Primary | ICD-10-CM

## 2021-05-27 DIAGNOSIS — R47.01 EXPRESSIVE APHASIA: ICD-10-CM

## 2021-05-27 DIAGNOSIS — M72.2 PLANTAR FASCIITIS: ICD-10-CM

## 2021-05-27 DIAGNOSIS — R73.9 HYPERGLYCEMIA: ICD-10-CM

## 2021-05-27 PROBLEM — I63.9 ACUTE CVA (CEREBROVASCULAR ACCIDENT) (HCC): Status: RESOLVED | Noted: 2020-03-27 | Resolved: 2021-05-27

## 2021-05-27 PROBLEM — E66.3 OVERWEIGHT (BMI 25.0-29.9): Status: RESOLVED | Noted: 2017-10-16 | Resolved: 2021-05-27

## 2021-05-27 PROCEDURE — 3074F SYST BP LT 130 MM HG: CPT | Performed by: FAMILY MEDICINE

## 2021-05-27 PROCEDURE — 3079F DIAST BP 80-89 MM HG: CPT | Performed by: FAMILY MEDICINE

## 2021-05-27 PROCEDURE — 3008F BODY MASS INDEX DOCD: CPT | Performed by: FAMILY MEDICINE

## 2021-05-27 PROCEDURE — G0439 PPPS, SUBSEQ VISIT: HCPCS | Performed by: FAMILY MEDICINE

## 2021-05-27 PROCEDURE — 99397 PER PM REEVAL EST PAT 65+ YR: CPT | Performed by: FAMILY MEDICINE

## 2021-05-27 PROCEDURE — 96160 PT-FOCUSED HLTH RISK ASSMT: CPT | Performed by: FAMILY MEDICINE

## 2021-05-27 RX ORDER — METRONIDAZOLE 7.5 MG/G
GEL TOPICAL
Qty: 45 G | Status: SHIPPED | OUTPATIENT
Start: 2021-05-27

## 2021-05-27 NOTE — PATIENT INSTRUCTIONS
Yuridia Adams's SCREENING SCHEDULE   Tests on this list are recommended by your physician but may not be covered, or covered at this frequency, by your insurer. Please check with your insurance carrier before scheduling to verify coverage.    PREVENT criteria:   • Men who are 73-68 years old and have smoked more than 100 cigarettes in their lifetime   • Anyone with a family history    Colorectal Cancer Screening  Covered up to Age 76     Colonoscopy Screen   Covered every 10 years- more often if abnorm are no preventive care reminders to display for this patient.  Please get this Mammogram regularly   Immunizations      Influenza  Covered Annually Orders placed or performed in visit on 10/09/20   • FLU VACC HIGH DOSE PRSV FREE   Orders placed or performed http://www. idph.state. [place]/public/books/advin.htm  A link to the [employer] This site has a lot of good information including definitions of the different types of Advance Directives.  It also has the State forms available on it's webs

## 2021-05-27 NOTE — PROGRESS NOTES
HPI:   Andres Boxer is a 76year old female who presents for a MA (Medicare Advantage) Supervisit (Once per calendar year).       Annual Physical due on 05/27/2022        Fall/Risk Assessment   She has been screened for Falls and is low risk: Fall/R rosacea- stable      Venous insufficiency- stable      Cerebrovascular accident (CVA) due to embolism of left middle cerebral artery ([address]- stable / still in speech therapy      Hyperglycemia- repeat labs in sept      Cognitive disorder- stable/  pr Cancer in her father and another family member; Heart Disorder in her maternal grandmother and mother; High Cholesterol in her father; Leukemia in an other family member; Obesity in her brother and mother; Stroke in her mother.    SOCIAL HISTORY:   She  rep midline,mucosa normal, no drainage or sinus tenderness   Throat: Lips, mucosa, and tongue normal; teeth and gums normal   Neck: Supple, symmetrical, trachea midline, no adenopathy;  thyroid: not enlarged, symmetric, no tenderness/mass/nodules; no carotid b Cognitive disorder- see neuro     Dyslipidemia- stable cpm   -     LIPID PANEL; Future  -     COMP METABOLIC PANEL (14);  Future    Expressive aphasia- continue therapy     History of arterial ischemic stroke  -     HEMOGLOBIN A1C; Future    Hyperglycem LDL CHOLESTROL (mg/dL)   Date Value   10/07/2013 86        EKG - w/ Initial Preventative Physical Exam only, or if medically necessary Electrocardiogram date03/06/2021       Colorectal Cancer Screening      Colonoscopy Screen every 10 years Colonoscopy of institutions for the mentally retarded   Persons who live in the same house as a HepB virus carrier   Homosexual men   Illicit injectable drug abusers     Tetanus Toxoid  Only covered with a cut with metal- TD and TDaP Not covered by Medicare Part B No

## 2021-06-14 RX ORDER — ATORVASTATIN CALCIUM 80 MG/1
TABLET, FILM COATED ORAL
Qty: 90 TABLET | Refills: 0 | Status: SHIPPED | OUTPATIENT
Start: 2021-06-14 | End: 2021-09-15

## 2021-06-30 ENCOUNTER — OFFICE VISIT (OUTPATIENT)
Dept: FAMILY MEDICINE CLINIC | Facility: CLINIC | Age: 76
End: 2021-06-30
Payer: MEDICARE

## 2021-06-30 VITALS
HEART RATE: 92 BPM | DIASTOLIC BLOOD PRESSURE: 80 MMHG | HEIGHT: 63 IN | WEIGHT: 154 LBS | RESPIRATION RATE: 16 BRPM | SYSTOLIC BLOOD PRESSURE: 104 MMHG | OXYGEN SATURATION: 97 % | BODY MASS INDEX: 27.29 KG/M2

## 2021-06-30 DIAGNOSIS — I80.00 SUPERFICIAL THROMBOPHLEBITIS OF LOWER EXTREMITY, UNSPECIFIED LATERALITY: Primary | ICD-10-CM

## 2021-06-30 PROCEDURE — 99213 OFFICE O/P EST LOW 20 MIN: CPT | Performed by: FAMILY MEDICINE

## 2021-06-30 PROCEDURE — 3008F BODY MASS INDEX DOCD: CPT | Performed by: FAMILY MEDICINE

## 2021-06-30 PROCEDURE — 3074F SYST BP LT 130 MM HG: CPT | Performed by: FAMILY MEDICINE

## 2021-06-30 PROCEDURE — 3079F DIAST BP 80-89 MM HG: CPT | Performed by: FAMILY MEDICINE

## 2021-06-30 RX ORDER — NAPROXEN 500 MG/1
500 TABLET ORAL 2 TIMES DAILY WITH MEALS
Qty: 14 TABLET | Refills: 0 | Status: SHIPPED | OUTPATIENT
Start: 2021-06-30 | End: 2021-07-08

## 2021-06-30 NOTE — PROGRESS NOTES
[facility] Progress Note    SUBJECTIVE: Sandra Kevin 76year old female is here today for Patient presents with:  Leg Pain: Rt leg, x 2 days      Has been having leg issues swelling, will get sore. Worried about phlebitis.     Was red an by mouth. • metroNIDAZOLE 0.75 % External Gel Apply to acne prone areas qAM 45 g PRN   • aspirin 325 MG Oral Tab EC Take 1 tablet (325 mg total) by mouth daily. 90 tablet 3         Assessment/Plan  Janice Owen was seen today for leg pain.     Diagnoses and

## 2021-07-06 ENCOUNTER — HOSPITAL ENCOUNTER (EMERGENCY)
Facility: HOSPITAL | Age: 76
Discharge: HOME | End: 2021-07-06
Attending: EMERGENCY MEDICINE
Payer: MEDICARE

## 2021-07-06 ENCOUNTER — APPOINTMENT (OUTPATIENT)
Dept: ULTRASOUND IMAGING | Facility: HOSPITAL | Age: 76
End: 2021-07-06
Attending: EMERGENCY MEDICINE
Payer: MEDICARE

## 2021-07-06 VITALS
DIASTOLIC BLOOD PRESSURE: 82 MMHG | HEART RATE: 77 BPM | TEMPERATURE: 99 F | WEIGHT: 140 LBS | BODY MASS INDEX: 25 KG/M2 | OXYGEN SATURATION: 97 % | RESPIRATION RATE: 16 BRPM | SYSTOLIC BLOOD PRESSURE: 128 MMHG

## 2021-07-06 DIAGNOSIS — L98.9 SKIN LESION: Primary | ICD-10-CM

## 2021-07-06 LAB
ALBUMIN SERPL-MCNC: 4.1 G/DL (ref 3.4–5)
ALBUMIN/GLOB SERPL: 1.2 {RATIO} (ref 1–2)
ALP LIVER SERPL-CCNC: 73 U/L
ALT SERPL-CCNC: 23 U/L
ANION GAP SERPL CALC-SCNC: 3 MMOL/L (ref 0–18)
AST SERPL-CCNC: 17 U/L (ref 15–37)
BASOPHILS # BLD AUTO: 0.06 X10(3) UL (ref 0–0.2)
BASOPHILS NFR BLD AUTO: 0.9 %
BILIRUB SERPL-MCNC: 0.4 MG/DL (ref 0.1–2)
BUN BLD-MCNC: 15 MG/DL (ref 7–18)
BUN/CREAT SERPL: 21.1 (ref 10–20)
CALCIUM BLD-MCNC: 9.9 MG/DL (ref 8.5–10.1)
CHLORIDE SERPL-SCNC: 110 MMOL/L (ref 98–112)
CO2 SERPL-SCNC: 27 MMOL/L (ref 21–32)
CREAT BLD-MCNC: 0.71 MG/DL
DEPRECATED RDW RBC AUTO: 47.6 FL (ref 35.1–46.3)
EOSINOPHIL # BLD AUTO: 0.2 X10(3) UL (ref 0–0.7)
EOSINOPHIL NFR BLD AUTO: 2.9 %
ERYTHROCYTE [DISTWIDTH] IN BLOOD BY AUTOMATED COUNT: 14.7 % (ref 11–15)
GLOBULIN PLAS-MCNC: 3.4 G/DL (ref 2.8–4.4)
GLUCOSE BLD-MCNC: 83 MG/DL (ref 70–99)
HCT VFR BLD AUTO: 43.5 %
HGB BLD-MCNC: 13.9 G/DL
IMM GRANULOCYTES # BLD AUTO: 0.02 X10(3) UL (ref 0–1)
IMM GRANULOCYTES NFR BLD: 0.3 %
LYMPHOCYTES # BLD AUTO: 1.94 X10(3) UL (ref 1–4)
LYMPHOCYTES NFR BLD AUTO: 28.6 %
M PROTEIN MFR SERPL ELPH: 7.5 G/DL (ref 6.4–8.2)
MCH RBC QN AUTO: 28 PG (ref 26–34)
MCHC RBC AUTO-ENTMCNC: 32 G/DL (ref 31–37)
MCV RBC AUTO: 87.5 FL
MONOCYTES # BLD AUTO: 0.72 X10(3) UL (ref 0.1–1)
MONOCYTES NFR BLD AUTO: 10.6 %
NEUTROPHILS # BLD AUTO: 3.84 X10 (3) UL (ref 1.5–7.7)
NEUTROPHILS # BLD AUTO: 3.84 X10(3) UL (ref 1.5–7.7)
NEUTROPHILS NFR BLD AUTO: 56.7 %
OSMOLALITY SERPL CALC.SUM OF ELEC: 290 MOSM/KG (ref 275–295)
PLATELET # BLD AUTO: 212 10(3)UL (ref 150–450)
POTASSIUM SERPL-SCNC: 4 MMOL/L (ref 3.5–5.1)
RBC # BLD AUTO: 4.97 X10(6)UL
SODIUM SERPL-SCNC: 140 MMOL/L (ref 136–145)
WBC # BLD AUTO: 6.8 X10(3) UL (ref 4–11)

## 2021-07-06 PROCEDURE — 93971 EXTREMITY STUDY: CPT | Performed by: EMERGENCY MEDICINE

## 2021-07-06 PROCEDURE — 85025 COMPLETE CBC W/AUTO DIFF WBC: CPT | Performed by: EMERGENCY MEDICINE

## 2021-07-06 PROCEDURE — 80053 COMPREHEN METABOLIC PANEL: CPT | Performed by: EMERGENCY MEDICINE

## 2021-07-06 PROCEDURE — 99284 EMERGENCY DEPT VISIT MOD MDM: CPT

## 2021-07-06 PROCEDURE — 36415 COLL VENOUS BLD VENIPUNCTURE: CPT

## 2021-07-07 NOTE — ED PROVIDER NOTES
Patient Seen in: [facility] Emergency Department      History   Patient presents with:  Swelling Edema    Stated Complaint: left leg redness and swelling    HPI/Subjective:   HPI    14-year-old woman here for evaluation of painful red patch over the °C)   Temp src Temporal   SpO2 98 %   O2 Device None (Room air)       Current:/82   Pulse 77   Temp 98.7 °F (37.1 °C) (Temporal)   Resp 16   Wt 63.5 kg   SpO2 97%   BMI 24.80 kg/m²         Physical Exam        Physical Exam  Vitals signs and nursing duplex ultrasound was used to evaluate the lower extremity venous system. B-mode two-dimensional images of the vascular structures, Doppler spectral analysis, and color flow. Doppler imaging were performed.   The following veins were imaged:  Common, deep,

## 2021-07-08 DIAGNOSIS — I80.00 SUPERFICIAL THROMBOPHLEBITIS OF LOWER EXTREMITY, UNSPECIFIED LATERALITY: ICD-10-CM

## 2021-07-08 RX ORDER — NAPROXEN 500 MG/1
500 TABLET ORAL 2 TIMES DAILY WITH MEALS
Qty: 14 TABLET | Refills: 0 | Status: SHIPPED | OUTPATIENT
Start: 2021-07-08 | End: 2021-12-08 | Stop reason: ALTCHOICE

## 2021-07-13 DIAGNOSIS — I80.00 SUPERFICIAL THROMBOPHLEBITIS OF LOWER EXTREMITY, UNSPECIFIED LATERALITY: ICD-10-CM

## 2021-07-13 RX ORDER — NAPROXEN 500 MG/1
500 TABLET ORAL 2 TIMES DAILY WITH MEALS
Qty: 14 TABLET | Refills: 0 | OUTPATIENT
Start: 2021-07-13

## 2021-07-19 ENCOUNTER — TELEPHONE (OUTPATIENT)
Dept: FAMILY MEDICINE CLINIC | Facility: CLINIC | Age: 76
End: 2021-07-19

## 2021-07-19 NOTE — TELEPHONE ENCOUNTER
CALLING WANTING TO KNOW WHOM THEY SHOULD SEE FOR HER VEINS. PT IS COMPLAINING OF LEG PAIN AFTER SEEING US.  SWOLLEN ALSO. HE IS ASKING WHAT ROUTE TO TAKE NOW. PLS CALL BACK TO ADVISE.

## 2021-07-19 NOTE — TELEPHONE ENCOUNTER
If the conservative options, like nsaids, heat, are not improving it or it is changing then yes I would recommend being seen in immediate care to start.     Imaging from the 6th of July didn't show what I suspected and it appears they may have evaluated it

## 2021-08-23 ENCOUNTER — TELEPHONE (OUTPATIENT)
Dept: FAMILY MEDICINE CLINIC | Facility: CLINIC | Age: 76
End: 2021-08-23

## 2021-08-23 NOTE — TELEPHONE ENCOUNTER
Dr. Marilin Rodriguez contacted me to let me know her blood pressure was elevated in the office and she has history of CVA, her recent Bps have been low with us, so I won't automatically recommend medication at this time.     Can we reach out to her and recommend check

## 2021-08-24 NOTE — TELEPHONE ENCOUNTER
Spoke to pt's  Koki Kerr on consent. He agrees to check b/p at home.  They do have a cuff at home  Also nurse appt made Thursday to check b/p

## 2021-08-26 ENCOUNTER — NURSE ONLY (OUTPATIENT)
Dept: FAMILY MEDICINE CLINIC | Facility: CLINIC | Age: 76
End: 2021-08-26
Payer: MEDICARE

## 2021-08-26 VITALS — DIASTOLIC BLOOD PRESSURE: 78 MMHG | SYSTOLIC BLOOD PRESSURE: 116 MMHG

## 2021-08-26 PROCEDURE — 3074F SYST BP LT 130 MM HG: CPT | Performed by: FAMILY MEDICINE

## 2021-08-26 PROCEDURE — 3078F DIAST BP <80 MM HG: CPT | Performed by: FAMILY MEDICINE

## 2021-09-07 DIAGNOSIS — I63.9 ACUTE CVA (CEREBROVASCULAR ACCIDENT) (HCC): ICD-10-CM

## 2021-09-07 NOTE — TELEPHONE ENCOUNTER
Rx Request  aspirin 325 MG Oral Tab EC    Disp:    90                R: 3    Associated Dx: Acute CVA    Last Refilled: 07/16/2020    Last Visit: 06/30/2021

## 2021-09-15 RX ORDER — ATORVASTATIN CALCIUM 80 MG/1
TABLET, FILM COATED ORAL
Qty: 90 TABLET | Refills: 0 | Status: SHIPPED | OUTPATIENT
Start: 2021-09-15

## 2021-09-23 ENCOUNTER — HOSPITAL ENCOUNTER (OUTPATIENT)
Dept: ULTRASOUND IMAGING | Age: 76
Discharge: HOME | End: 2021-09-23
Attending: SURGERY
Payer: MEDICARE

## 2021-09-23 DIAGNOSIS — I63.9 LEFT PONTINE CVA (HCC): ICD-10-CM

## 2021-09-23 PROCEDURE — 93880 EXTRACRANIAL BILAT STUDY: CPT | Performed by: SURGERY

## 2021-10-25 ENCOUNTER — TELEPHONE (OUTPATIENT)
Dept: FAMILY MEDICINE CLINIC | Facility: CLINIC | Age: 76
End: 2021-10-25

## 2021-10-25 DIAGNOSIS — Z01.84 IMMUNITY STATUS TESTING: Primary | ICD-10-CM

## 2021-10-25 NOTE — TELEPHONE ENCOUNTER
Please call the [facility] at [phone] if any of your medications change.  This means: if a med is discontinued, a new med is started, or a dose is changed.  These meds may interact with your warfarin and we may need to adjust your dose.  This is especially important if you start any type of ANTIBIOTIC.    Also, please call if you have any admissions to the hospital, visits to an emergency room, procedures planned, or if any other medical provider has instructed you to hold your warfarin.     Pt is going to discuss covid antibodies with  and call back with decision.

## 2021-10-25 NOTE — TELEPHONE ENCOUNTER
calling back letting us know that they would like to have the covid antibody test done. Wanted to know who they call to schedule.

## 2021-10-25 NOTE — TELEPHONE ENCOUNTER
Genoveva Dominguez for ab test  Ask him to consult with neurology - may be able to mix booster / await CDC final recs / J&J as a booster if cleared by neuro

## 2021-10-25 NOTE — TELEPHONE ENCOUNTER
Patients  calling wanting to know if patient should get the [facility] booster shot since she had reacted poorly to the 2nd dose - states that she was disoriented and had stroke like symptoms. He is reluctant for her to receive it. Please advise.

## 2021-10-27 ENCOUNTER — TELEPHONE (OUTPATIENT)
Dept: FAMILY MEDICINE CLINIC | Facility: CLINIC | Age: 76
End: 2021-10-27

## 2021-10-27 ENCOUNTER — LAB ENCOUNTER (OUTPATIENT)
Dept: LAB | Facility: HOSPITAL | Age: 76
End: 2021-10-27
Attending: FAMILY MEDICINE
Payer: MEDICARE

## 2021-10-27 DIAGNOSIS — Z01.84 IMMUNITY STATUS TESTING: ICD-10-CM

## 2021-10-27 PROCEDURE — 86769 SARS-COV-2 COVID-19 ANTIBODY: CPT

## 2021-10-27 PROCEDURE — 36415 COLL VENOUS BLD VENIPUNCTURE: CPT

## 2021-10-27 NOTE — TELEPHONE ENCOUNTER
Patients  calling. Received in MyChart the antibody test result and does not understand what it means. Please advise.

## 2021-10-28 NOTE — TELEPHONE ENCOUNTER
Pt's  received message. He wants to know how long the antibodies last and does this mean pt does not need a COVID shot?

## 2021-12-08 ENCOUNTER — LAB ENCOUNTER (OUTPATIENT)
Dept: LAB | Age: 76
End: 2021-12-08
Attending: FAMILY MEDICINE
Payer: MEDICARE

## 2021-12-08 ENCOUNTER — OFFICE VISIT (OUTPATIENT)
Dept: FAMILY MEDICINE CLINIC | Facility: CLINIC | Age: 76
End: 2021-12-08
Payer: MEDICARE

## 2021-12-08 VITALS
DIASTOLIC BLOOD PRESSURE: 82 MMHG | WEIGHT: 158 LBS | OXYGEN SATURATION: 98 % | RESPIRATION RATE: 16 BRPM | HEART RATE: 93 BPM | SYSTOLIC BLOOD PRESSURE: 116 MMHG | BODY MASS INDEX: 28 KG/M2 | HEIGHT: 63 IN

## 2021-12-08 DIAGNOSIS — R73.9 HYPERGLYCEMIA: ICD-10-CM

## 2021-12-08 DIAGNOSIS — Z01.84 COVID-19 VIRUS IGG ANTIBODY TEST RESULT UNKNOWN: ICD-10-CM

## 2021-12-08 DIAGNOSIS — G89.29 CHRONIC PAIN OF BOTH KNEES: ICD-10-CM

## 2021-12-08 DIAGNOSIS — E78.5 DYSLIPIDEMIA: ICD-10-CM

## 2021-12-08 DIAGNOSIS — M25.561 CHRONIC PAIN OF BOTH KNEES: ICD-10-CM

## 2021-12-08 DIAGNOSIS — R53.83 OTHER FATIGUE: ICD-10-CM

## 2021-12-08 DIAGNOSIS — Z86.73 HISTORY OF ARTERIAL ISCHEMIC STROKE: ICD-10-CM

## 2021-12-08 DIAGNOSIS — E04.9 GOITER: ICD-10-CM

## 2021-12-08 DIAGNOSIS — Z23 NEED FOR VACCINATION: ICD-10-CM

## 2021-12-08 DIAGNOSIS — E55.9 VITAMIN D DEFICIENCY: ICD-10-CM

## 2021-12-08 DIAGNOSIS — I63.412 CEREBROVASCULAR ACCIDENT (CVA) DUE TO EMBOLISM OF LEFT MIDDLE CEREBRAL ARTERY (HCC): ICD-10-CM

## 2021-12-08 DIAGNOSIS — F09 COGNITIVE DISORDER: ICD-10-CM

## 2021-12-08 DIAGNOSIS — M25.562 CHRONIC PAIN OF BOTH KNEES: ICD-10-CM

## 2021-12-08 DIAGNOSIS — M17.11 OSTEOARTHRITIS OF RIGHT KNEE, UNSPECIFIED OSTEOARTHRITIS TYPE: Primary | ICD-10-CM

## 2021-12-08 PROCEDURE — 83036 HEMOGLOBIN GLYCOSYLATED A1C: CPT

## 2021-12-08 PROCEDURE — 3074F SYST BP LT 130 MM HG: CPT | Performed by: FAMILY MEDICINE

## 2021-12-08 PROCEDURE — 84443 ASSAY THYROID STIM HORMONE: CPT

## 2021-12-08 PROCEDURE — G0008 ADMIN INFLUENZA VIRUS VAC: HCPCS | Performed by: FAMILY MEDICINE

## 2021-12-08 PROCEDURE — 3079F DIAST BP 80-89 MM HG: CPT | Performed by: FAMILY MEDICINE

## 2021-12-08 PROCEDURE — 90662 IIV NO PRSV INCREASED AG IM: CPT | Performed by: FAMILY MEDICINE

## 2021-12-08 PROCEDURE — 86769 SARS-COV-2 COVID-19 ANTIBODY: CPT

## 2021-12-08 PROCEDURE — 82306 VITAMIN D 25 HYDROXY: CPT

## 2021-12-08 PROCEDURE — 36415 COLL VENOUS BLD VENIPUNCTURE: CPT

## 2021-12-08 PROCEDURE — 82607 VITAMIN B-12: CPT

## 2021-12-08 PROCEDURE — 99215 OFFICE O/P EST HI 40 MIN: CPT | Performed by: FAMILY MEDICINE

## 2021-12-08 PROCEDURE — 80053 COMPREHEN METABOLIC PANEL: CPT

## 2021-12-08 PROCEDURE — 84439 ASSAY OF FREE THYROXINE: CPT

## 2021-12-08 PROCEDURE — 80061 LIPID PANEL: CPT

## 2021-12-08 PROCEDURE — 85025 COMPLETE CBC W/AUTO DIFF WBC: CPT

## 2021-12-08 PROCEDURE — 3008F BODY MASS INDEX DOCD: CPT | Performed by: FAMILY MEDICINE

## 2021-12-08 NOTE — PROGRESS NOTES
HPI:   Alejandro Liu is a 68year old female who presents with chronic knee pain and h/o CVA     Adult children present - first appt and want to discuss all care    not present who usually attends all appt     Pt has been having more knee pain Father    • Stroke Mother    • Heart Disorder Mother    • Obesity Mother    • Cancer Other         ovarian ca   • Other (Leukemia) Other         aunt   • Breast Cancer Other         aunt   • Heart Disorder Maternal Grandmother    • Obesity Brother       So virus IgG antibody test result unknown  To consult with neuro about booster   - SARS-COV-2 TOTAL ANTIBODY; Future    4. Need for vaccination    - FLU VACC HIGH DOSE PRSV FREE    5.  Cerebrovascular accident (CVA) due to embolism of left middle cerebral joe

## 2021-12-10 ENCOUNTER — TELEPHONE (OUTPATIENT)
Dept: FAMILY MEDICINE CLINIC | Facility: CLINIC | Age: 76
End: 2021-12-10

## 2021-12-10 DIAGNOSIS — Z23 NEED FOR PNEUMOCOCCAL VACCINE: Primary | ICD-10-CM

## 2021-12-10 NOTE — TELEPHONE ENCOUNTER
Pt had [id number] on 1/1/2010 and prevnar 13 on 12/3/2015. Since it's been 6 years, do you want her to get another [id number]?

## 2021-12-12 ENCOUNTER — PATIENT MESSAGE (OUTPATIENT)
Dept: FAMILY MEDICINE CLINIC | Facility: CLINIC | Age: 76
End: 2021-12-12

## 2021-12-13 NOTE — TELEPHONE ENCOUNTER
From: Alejandro Liu  To: Radha Ray DO  Sent: 12/12/2021 6:10 PM CST  Subject: Cholesterol / blood work     I don't see any hdl/ldl cholesterol results. Am I misreading?

## 2021-12-29 ENCOUNTER — TELEPHONE (OUTPATIENT)
Dept: NEUROLOGY | Facility: CLINIC | Age: 76
End: 2021-12-29

## 2021-12-29 NOTE — TELEPHONE ENCOUNTER
Pts spouse Josh Patten stated pt had side effects from the second covid moderna vaccine and asking if she should get the booster with pfizer.     Pt has an appt on 1/5/21 and only coming in to discuss this issue so asking if should still come in for appt due to

## 2021-12-29 NOTE — TELEPHONE ENCOUNTER
Spoke with  Clara Franco (per consent), who states after Moderna  2nd dose, patient got confused momentarily about operating washing machine and also general confusion.     States she is following up with Dr Altagracia Gallagher (1/5/2022) post hospitalization for str

## 2022-01-04 ENCOUNTER — TELEPHONE (OUTPATIENT)
Dept: PHYSICAL THERAPY | Facility: HOSPITAL | Age: 77
End: 2022-01-04

## 2022-01-04 ENCOUNTER — OFFICE VISIT (OUTPATIENT)
Dept: PHYSICAL THERAPY | Facility: HOSPITAL | Age: 77
End: 2022-01-04
Attending: FAMILY MEDICINE
Payer: MEDICARE

## 2022-01-04 DIAGNOSIS — M25.562 CHRONIC PAIN OF BOTH KNEES: ICD-10-CM

## 2022-01-04 DIAGNOSIS — G89.29 CHRONIC PAIN OF BOTH KNEES: ICD-10-CM

## 2022-01-04 DIAGNOSIS — M17.11 OSTEOARTHRITIS OF RIGHT KNEE, UNSPECIFIED OSTEOARTHRITIS TYPE: ICD-10-CM

## 2022-01-04 DIAGNOSIS — M25.561 CHRONIC PAIN OF BOTH KNEES: ICD-10-CM

## 2022-01-04 PROCEDURE — 97110 THERAPEUTIC EXERCISES: CPT

## 2022-01-04 PROCEDURE — 97162 PT EVAL MOD COMPLEX 30 MIN: CPT

## 2022-01-04 NOTE — PROGRESS NOTES
LOWER EXTREMITY EVALUATION:   Referring Physician: Dr. Lora Quach  Diagnosis: Osteoarthritis right knee. Chronic pain B knees.       Date of Service: 1/4/2022     PATIENT SUMMARY   Pernell Stevens is a 68year old female who presents to therapy today w is medically necessary to address the above impairments and reach functional goals. Precautions:  None  OBJECTIVE:   Observation: mild effusion right knee. Sit to standing with mild UE assist.   Knee circumference at joint line:  R 43cm. L 42cm. extension ROM to 0 deg to allow proper heel strike during gait and terminal knee extension in stance   · Pt will improve knee AROM flexion to >115 degrees to improve ability to perform in/out of car easier.     · Pt will improve quad strength to 5/5 to [facility]

## 2022-01-05 ENCOUNTER — OFFICE VISIT (OUTPATIENT)
Dept: NEUROLOGY | Facility: CLINIC | Age: 77
End: 2022-01-05
Payer: MEDICARE

## 2022-01-05 VITALS
WEIGHT: 150 LBS | RESPIRATION RATE: 16 BRPM | HEART RATE: 81 BPM | SYSTOLIC BLOOD PRESSURE: 141 MMHG | DIASTOLIC BLOOD PRESSURE: 63 MMHG | HEIGHT: 63 IN | BODY MASS INDEX: 26.58 KG/M2

## 2022-01-05 DIAGNOSIS — I69.320 APHASIA AS LATE EFFECT OF STROKE: Primary | ICD-10-CM

## 2022-01-05 PROCEDURE — 3008F BODY MASS INDEX DOCD: CPT | Performed by: OTHER

## 2022-01-05 PROCEDURE — 3078F DIAST BP <80 MM HG: CPT | Performed by: OTHER

## 2022-01-05 PROCEDURE — 3077F SYST BP >= 140 MM HG: CPT | Performed by: OTHER

## 2022-01-05 PROCEDURE — 99215 OFFICE O/P EST HI 40 MIN: CPT | Performed by: OTHER

## 2022-01-05 NOTE — PROGRESS NOTES
[facility] Progress Note    Patient presents with:  Stroke      HPI  Patient was previously seen in hospital in consultation:    As per initial visit at that time 3/7/2021:     Patient is a 75F with complex PMH including L MCA stroke Ma Obese 2006   • Obesity    • Osteoarthritis     L knee   • Stroke (cerebrum) ([facility] [address] 03/2020   • Tendinitis    • Thyroid nodule      Past Surgical History:   Procedure Laterality Date   • KNEE REPLACEMENT SURGERY  7/26/2010    L knee unicompartment replacemen Estimated body mass index is 26.57 kg/m² as calculated from the following:    Height as of this encounter: 63\". Weight as of this encounter: 150 lb (68 kg).     Gen: well developed, well nourished, no acute distress  HEENT: normocephalic  Heart; sunny demonstrate significant stenosis.  Please see details as above.     Echo: 3/7/2021:  Conclusions:     1. Left ventricle: The cavity size was normal. Wall thickness was normal.      Systolic function was normal. The estimated ejection fraction was 60-65%. stroke over the left hemisphere.        No epileptiform activity, or clinical or electrographic seizures were noted on this awake and drowsy recording.               Impression/ Plan:  Srinivasan Nicholson is a 68year old with complex PMH including L MCA str to the emergency room should she experience any symptoms including, but not limited to the following: sudden onset of slurred speech, numbness/tingling/weakness on one side of the body, confusion, double vision, loss of vision, vertigo,        (I69.320) Ap

## 2022-01-05 NOTE — PROGRESS NOTES
NP 3/4/21 ED Stroke/aphasia f/u- Patient present today with . Patient states she had stroke & has been experiencing slurred speech. Patient states she thinks this might be due to 2nd moderna shot. Patient denies any complaints of weakness.

## 2022-01-06 ENCOUNTER — OFFICE VISIT (OUTPATIENT)
Dept: PHYSICAL THERAPY | Facility: HOSPITAL | Age: 77
End: 2022-01-06
Attending: FAMILY MEDICINE
Payer: MEDICARE

## 2022-01-06 DIAGNOSIS — G89.29 CHRONIC PAIN OF BOTH KNEES: ICD-10-CM

## 2022-01-06 DIAGNOSIS — M25.562 CHRONIC PAIN OF BOTH KNEES: ICD-10-CM

## 2022-01-06 DIAGNOSIS — M17.11 OSTEOARTHRITIS OF RIGHT KNEE, UNSPECIFIED OSTEOARTHRITIS TYPE: ICD-10-CM

## 2022-01-06 DIAGNOSIS — M25.561 CHRONIC PAIN OF BOTH KNEES: ICD-10-CM

## 2022-01-06 PROCEDURE — 97140 MANUAL THERAPY 1/> REGIONS: CPT

## 2022-01-06 PROCEDURE — 97110 THERAPEUTIC EXERCISES: CPT

## 2022-01-06 NOTE — PROGRESS NOTES
Dx: OA R knee; chronic pain B knees. Insurance (Authorized # of Visits):  8           Authorizing Physician: Dr. Rosenda Cartwright MD visit: none scheduled/tbd. ...   Fall Risk: standard         Precautions: n/a             Subjective: B knee pain pers l/r..       Bridging with calves on SB 2 x 10. Supine hip and knee PROM by PT l/r.. Hook lying HSS by PT for 1 minute each leg. Supine clam shells with green band around knees 3 x 10.         Seated KF with green band around heel x 10 l/

## 2022-01-07 ENCOUNTER — NURSE ONLY (OUTPATIENT)
Dept: FAMILY MEDICINE CLINIC | Facility: CLINIC | Age: 77
End: 2022-01-07
Payer: MEDICARE

## 2022-01-07 PROCEDURE — G0009 ADMIN PNEUMOCOCCAL VACCINE: HCPCS | Performed by: FAMILY MEDICINE

## 2022-01-07 PROCEDURE — 90732 PPSV23 VACC 2 YRS+ SUBQ/IM: CPT | Performed by: FAMILY MEDICINE

## 2022-01-11 ENCOUNTER — OFFICE VISIT (OUTPATIENT)
Dept: PHYSICAL THERAPY | Facility: HOSPITAL | Age: 77
End: 2022-01-11
Attending: FAMILY MEDICINE
Payer: MEDICARE

## 2022-01-11 PROCEDURE — 97140 MANUAL THERAPY 1/> REGIONS: CPT

## 2022-01-11 PROCEDURE — 97110 THERAPEUTIC EXERCISES: CPT

## 2022-01-11 NOTE — PROGRESS NOTES
Dx: OA R knee; chronic pain B knees. Insurance (Authorized # of Visits):  8           Authorizing Physician: Dr. Amy Cartwright MD visit: none scheduled/tbd. ...   Fall Risk: standard         Precautions: n/a             Subjective: B knee pain pers superior and inferior glides by PT.   QS into a folded towel 5 second holds x 10 reps each leg. NuStep (8/10) load 4 for 5 minutes. stm B knee area by PT. .. B PF superior and inferior glides by PT. X 10 reps each leg.        AA DKTC with SB 2 x 10

## 2022-01-13 ENCOUNTER — OFFICE VISIT (OUTPATIENT)
Dept: PHYSICAL THERAPY | Facility: HOSPITAL | Age: 77
End: 2022-01-13
Attending: FAMILY MEDICINE
Payer: MEDICARE

## 2022-01-13 PROCEDURE — 97110 THERAPEUTIC EXERCISES: CPT

## 2022-01-13 PROCEDURE — 97140 MANUAL THERAPY 1/> REGIONS: CPT

## 2022-01-13 NOTE — PROGRESS NOTES
Dx: OA R knee; chronic pain B knees. Insurance (Authorized # of Visits):  6 visits approved. .. Authorizing Physician: Dr. Lora Quach  Next MD visit: none scheduled/tbd. ...   Fall Risk: standard         Precautions: n/a             Subjective: TX#: 4/6 Date:                 TX#: 5/ Date: Tx#: 6/   Tressa (8/10) load 4 for 5 minutes. stm B knee area by PT including HS tendons, quad muscle, and IT band.    B PF superior and inferior glides by PT.   QS into a folded towel 5 second holds x 10 r reps. .     HEP: QS; supine clam shells with green band, LTR, DKTC, bridging with SB use. Charges: Tracy Ferns.        Total Timed Treatment: 42 min  Total Treatment Time: 42 min

## 2022-01-14 ENCOUNTER — TELEPHONE (OUTPATIENT)
Dept: FAMILY MEDICINE CLINIC | Facility: CLINIC | Age: 77
End: 2022-01-14

## 2022-01-14 DIAGNOSIS — M25.561 CHRONIC PAIN OF BOTH KNEES: ICD-10-CM

## 2022-01-14 DIAGNOSIS — M17.11 OSTEOARTHRITIS OF RIGHT KNEE, UNSPECIFIED OSTEOARTHRITIS TYPE: Primary | ICD-10-CM

## 2022-01-14 DIAGNOSIS — M25.562 CHRONIC PAIN OF BOTH KNEES: ICD-10-CM

## 2022-01-14 DIAGNOSIS — G89.29 CHRONIC PAIN OF BOTH KNEES: ICD-10-CM

## 2022-01-17 NOTE — TELEPHONE ENCOUNTER
Last OV: 12/8/21- referral entered for PT for osteoarthritis of right knee/chronic pain of both knees. Last PT visit: 1/13/22    Dr. Charanjit Purcell, ok for PT for additional visits?

## 2022-01-18 ENCOUNTER — OFFICE VISIT (OUTPATIENT)
Dept: PHYSICAL THERAPY | Facility: HOSPITAL | Age: 77
End: 2022-01-18
Attending: FAMILY MEDICINE
Payer: MEDICARE

## 2022-01-18 PROCEDURE — 97140 MANUAL THERAPY 1/> REGIONS: CPT

## 2022-01-18 PROCEDURE — 97110 THERAPEUTIC EXERCISES: CPT

## 2022-01-18 NOTE — PROGRESS NOTES
Dx: OA R knee; chronic pain B knees. Insurance (Authorized # of Visits):  6 visits approved. .. Authorizing Physician: Dr. Rogelio Watson  Next MD visit: none scheduled/tbd. ...   Fall Risk: standard         Precautions: n/a             Subjective: evaluation. .. Date: 1/6/2022  TX#: 2/10 Date:  1/11/22               TX#: 3/10 Date:   1/13/22              TX#: 4/6 Date:  1/18/22               TX#: 5/6 Date: Tx#: 6/   Tressa (8/10) load 4 for 5 minutes.    stm B knee area by PT including HS tendons, l/r with HHA prn. . 4\" [facility] x 15 each leg with HHA prn. . 4\" [facility] x 15 each leg with HHA prn. . 4\" [facility] x 15 left and right with HHA prn. .    Airex DL standing for 2-3 minutes with challenges. Standing shallow sit-backs x 10 reps. . Airex DL standing for 3-4

## 2022-01-20 ENCOUNTER — OFFICE VISIT (OUTPATIENT)
Dept: PHYSICAL THERAPY | Facility: HOSPITAL | Age: 77
End: 2022-01-20
Attending: FAMILY MEDICINE
Payer: MEDICARE

## 2022-01-20 PROCEDURE — 97110 THERAPEUTIC EXERCISES: CPT

## 2022-01-20 PROCEDURE — 97140 MANUAL THERAPY 1/> REGIONS: CPT

## 2022-01-20 NOTE — PROGRESS NOTES
Dx: OA R knee; chronic pain B knees. Insurance (Authorized # of Visits):  6 visits approved. .. Authorizing Physician: Dr. Radha Cartwright MD visit: none scheduled/tbd. ...   Fall Risk: standard         Precautions: n/a             Discharge S TX#: 5/6 Date: 1/20/22  Tx#: 6/6   NuStep (8/10) load 4 for 5 minutes. stm B knee area by PT including HS tendons, quad muscle, and IT band.    B PF superior and inferior glides by PT.   QS into a folded towel 5 second holds x 10 reps each l l/r..    X 10 l/r. .. HEP. X.    Wide balance board a/p x 25. A/P x 25. Wide balance board a/p x 25. Wide balance board a/p for 25 reps. . A/P x 20.    4\" [facility] x 15 l/r with HHA prn. . 4\" [facility] x 15 each leg with HHA prn. . 4\" [facility] x 15 each leg with HHA

## 2022-01-25 ENCOUNTER — APPOINTMENT (OUTPATIENT)
Dept: PHYSICAL THERAPY | Facility: HOSPITAL | Age: 77
End: 2022-01-25
Attending: FAMILY MEDICINE
Payer: MEDICARE

## 2022-01-27 ENCOUNTER — APPOINTMENT (OUTPATIENT)
Dept: PHYSICAL THERAPY | Facility: HOSPITAL | Age: 77
End: 2022-01-27
Attending: FAMILY MEDICINE
Payer: MEDICARE

## 2022-02-14 RX ORDER — ATORVASTATIN CALCIUM 80 MG/1
TABLET, FILM COATED ORAL
Qty: 90 TABLET | Refills: 0 | Status: SHIPPED | OUTPATIENT
Start: 2022-02-14

## 2022-03-03 ENCOUNTER — MED REC SCAN ONLY (OUTPATIENT)
Dept: FAMILY MEDICINE CLINIC | Facility: CLINIC | Age: 77
End: 2022-03-03

## 2022-03-10 ENCOUNTER — HOSPITAL ENCOUNTER (OUTPATIENT)
Age: 77
Discharge: HOME | End: 2022-03-10
Payer: MEDICARE

## 2022-03-10 VITALS
RESPIRATION RATE: 18 BRPM | HEART RATE: 82 BPM | DIASTOLIC BLOOD PRESSURE: 86 MMHG | OXYGEN SATURATION: 99 % | SYSTOLIC BLOOD PRESSURE: 126 MMHG | TEMPERATURE: 98 F

## 2022-03-10 DIAGNOSIS — N30.00 ACUTE CYSTITIS WITHOUT HEMATURIA: ICD-10-CM

## 2022-03-10 DIAGNOSIS — R35.0 URINARY FREQUENCY: Primary | ICD-10-CM

## 2022-03-10 LAB
POCT GLUCOSE URINE: 100 MG/DL
POCT KETONE URINE: NEGATIVE MG/DL
POCT NITRITE URINE: POSITIVE
POCT PH URINE: 5.5 (ref 5–8)
POCT SPECIFIC GRAVITY URINE: 1.03
POCT URINE COLOR: YELLOW
POCT UROBILINOGEN URINE: 0.2 MG/DL

## 2022-03-10 PROCEDURE — 81002 URINALYSIS NONAUTO W/O SCOPE: CPT | Performed by: PHYSICIAN ASSISTANT

## 2022-03-10 PROCEDURE — 99213 OFFICE O/P EST LOW 20 MIN: CPT | Performed by: PHYSICIAN ASSISTANT

## 2022-03-10 RX ORDER — CEPHALEXIN 500 MG/1
500 CAPSULE ORAL 2 TIMES DAILY
Qty: 14 CAPSULE | Refills: 0 | Status: SHIPPED | OUTPATIENT
Start: 2022-03-10 | End: 2022-03-17

## 2022-03-10 NOTE — ED INITIAL ASSESSMENT (HPI)
Pt began 2 days ago with burning with urination and has been going more frequently like every 2 hours and today urine cloudy

## 2022-04-04 ENCOUNTER — OFFICE VISIT (OUTPATIENT)
Dept: FAMILY MEDICINE CLINIC | Facility: CLINIC | Age: 77
End: 2022-04-04
Payer: MEDICARE

## 2022-04-04 ENCOUNTER — LAB ENCOUNTER (OUTPATIENT)
Dept: LAB | Age: 77
End: 2022-04-04
Attending: FAMILY MEDICINE
Payer: MEDICARE

## 2022-04-04 VITALS
RESPIRATION RATE: 18 BRPM | BODY MASS INDEX: 28 KG/M2 | HEIGHT: 63 IN | SYSTOLIC BLOOD PRESSURE: 128 MMHG | HEART RATE: 90 BPM | WEIGHT: 158 LBS | DIASTOLIC BLOOD PRESSURE: 70 MMHG | OXYGEN SATURATION: 99 %

## 2022-04-04 DIAGNOSIS — L71.9 ACNE ROSACEA: ICD-10-CM

## 2022-04-04 DIAGNOSIS — R47.01 EXPRESSIVE APHASIA: ICD-10-CM

## 2022-04-04 DIAGNOSIS — I69.951 HEMIPLEGIA OF RIGHT DOMINANT SIDE AS LATE EFFECT OF CEREBROVASCULAR DISEASE, UNSPECIFIED CEREBROVASCULAR DISEASE TYPE, UNSPECIFIED HEMIPLEGIA TYPE (HCC): ICD-10-CM

## 2022-04-04 DIAGNOSIS — Z12.31 ENCOUNTER FOR SCREENING MAMMOGRAM FOR HIGH-RISK PATIENT: ICD-10-CM

## 2022-04-04 DIAGNOSIS — Z01.84 COVID-19 VIRUS IGG ANTIBODY TEST RESULT UNKNOWN: ICD-10-CM

## 2022-04-04 DIAGNOSIS — I87.2 VENOUS INSUFFICIENCY: ICD-10-CM

## 2022-04-04 DIAGNOSIS — E04.9 GOITER: ICD-10-CM

## 2022-04-04 DIAGNOSIS — E78.5 DYSLIPIDEMIA: ICD-10-CM

## 2022-04-04 DIAGNOSIS — R73.9 HYPERGLYCEMIA: ICD-10-CM

## 2022-04-04 DIAGNOSIS — I10 ESSENTIAL HYPERTENSION: ICD-10-CM

## 2022-04-04 DIAGNOSIS — R53.83 OTHER FATIGUE: ICD-10-CM

## 2022-04-04 DIAGNOSIS — Z13.820 SCREENING FOR OSTEOPOROSIS: ICD-10-CM

## 2022-04-04 DIAGNOSIS — E55.9 VITAMIN D DEFICIENCY: ICD-10-CM

## 2022-04-04 DIAGNOSIS — Z86.73 HISTORY OF ARTERIAL ISCHEMIC STROKE: ICD-10-CM

## 2022-04-04 DIAGNOSIS — M79.89 LOCALIZED SWELLING OF BOTH LOWER EXTREMITIES: ICD-10-CM

## 2022-04-04 DIAGNOSIS — Z00.00 ENCOUNTER FOR ANNUAL HEALTH EXAMINATION: Primary | ICD-10-CM

## 2022-04-04 DIAGNOSIS — G45.9 TIA (TRANSIENT ISCHEMIC ATTACK): ICD-10-CM

## 2022-04-04 DIAGNOSIS — Z78.0 ASYMPTOMATIC MENOPAUSE: ICD-10-CM

## 2022-04-04 DIAGNOSIS — F09 COGNITIVE DISORDER: ICD-10-CM

## 2022-04-04 DIAGNOSIS — I10 PRIMARY HYPERTENSION: ICD-10-CM

## 2022-04-04 LAB
ALBUMIN SERPL-MCNC: 3.7 G/DL (ref 3.4–5)
ALBUMIN/GLOB SERPL: 1.2 {RATIO} (ref 1–2)
ALP LIVER SERPL-CCNC: 73 U/L
ALT SERPL-CCNC: 28 U/L
ANION GAP SERPL CALC-SCNC: 4 MMOL/L (ref 0–18)
AST SERPL-CCNC: 15 U/L (ref 15–37)
BASOPHILS # BLD AUTO: 0.06 X10(3) UL (ref 0–0.2)
BASOPHILS NFR BLD AUTO: 0.9 %
BILIRUB SERPL-MCNC: 0.4 MG/DL (ref 0.1–2)
BUN BLD-MCNC: 18 MG/DL (ref 7–18)
CALCIUM BLD-MCNC: 10 MG/DL (ref 8.5–10.1)
CHLORIDE SERPL-SCNC: 108 MMOL/L (ref 98–112)
CHOLEST SERPL-MCNC: 134 MG/DL (ref ?–200)
CO2 SERPL-SCNC: 29 MMOL/L (ref 21–32)
CREAT BLD-MCNC: 0.88 MG/DL
EOSINOPHIL # BLD AUTO: 0.09 X10(3) UL (ref 0–0.7)
EOSINOPHIL NFR BLD AUTO: 1.4 %
ERYTHROCYTE [DISTWIDTH] IN BLOOD BY AUTOMATED COUNT: 15.3 %
EST. AVERAGE GLUCOSE BLD GHB EST-MCNC: 131 MG/DL (ref 68–126)
FASTING PATIENT LIPID ANSWER: NO
FASTING STATUS PATIENT QL REPORTED: NO
GLOBULIN PLAS-MCNC: 3.1 G/DL (ref 2.8–4.4)
GLUCOSE BLD-MCNC: 97 MG/DL (ref 70–99)
HBA1C MFR BLD: 6.2 % (ref ?–5.7)
HCT VFR BLD AUTO: 44.2 %
HDLC SERPL-MCNC: 55 MG/DL (ref 40–59)
HGB BLD-MCNC: 14 G/DL
IMM GRANULOCYTES # BLD AUTO: 0.02 X10(3) UL (ref 0–1)
IMM GRANULOCYTES NFR BLD: 0.3 %
LDLC SERPL CALC-MCNC: 60 MG/DL (ref ?–100)
LYMPHOCYTES # BLD AUTO: 1.47 X10(3) UL (ref 1–4)
LYMPHOCYTES NFR BLD AUTO: 22.3 %
MCH RBC QN AUTO: 28.7 PG (ref 26–34)
MCHC RBC AUTO-ENTMCNC: 31.7 G/DL (ref 31–37)
MCV RBC AUTO: 90.6 FL
MONOCYTES # BLD AUTO: 0.63 X10(3) UL (ref 0.1–1)
MONOCYTES NFR BLD AUTO: 9.5 %
NEUTROPHILS # BLD AUTO: 4.33 X10 (3) UL (ref 1.5–7.7)
NEUTROPHILS # BLD AUTO: 4.33 X10(3) UL (ref 1.5–7.7)
NEUTROPHILS NFR BLD AUTO: 65.6 %
NONHDLC SERPL-MCNC: 79 MG/DL (ref ?–130)
OSMOLALITY SERPL CALC.SUM OF ELEC: 294 MOSM/KG (ref 275–295)
PLATELET # BLD AUTO: 200 10(3)UL (ref 150–450)
POTASSIUM SERPL-SCNC: 3.9 MMOL/L (ref 3.5–5.1)
PROT SERPL-MCNC: 6.8 G/DL (ref 6.4–8.2)
RBC # BLD AUTO: 4.88 X10(6)UL
SARS-COV-2 IGG+IGM SERPL QL IA: REACTIVE
SODIUM SERPL-SCNC: 141 MMOL/L (ref 136–145)
T4 FREE SERPL-MCNC: 1.1 NG/DL (ref 0.8–1.7)
TRIGL SERPL-MCNC: 104 MG/DL (ref 30–149)
TSI SER-ACNC: 0.66 MIU/ML (ref 0.36–3.74)
VIT B12 SERPL-MCNC: 390 PG/ML (ref 193–986)
VIT D+METAB SERPL-MCNC: 52.2 NG/ML (ref 30–100)
VLDLC SERPL CALC-MCNC: 15 MG/DL (ref 0–30)
WBC # BLD AUTO: 6.6 X10(3) UL (ref 4–11)

## 2022-04-04 PROCEDURE — 3074F SYST BP LT 130 MM HG: CPT | Performed by: FAMILY MEDICINE

## 2022-04-04 PROCEDURE — G0439 PPPS, SUBSEQ VISIT: HCPCS | Performed by: FAMILY MEDICINE

## 2022-04-04 PROCEDURE — 96160 PT-FOCUSED HLTH RISK ASSMT: CPT | Performed by: FAMILY MEDICINE

## 2022-04-04 PROCEDURE — 85025 COMPLETE CBC W/AUTO DIFF WBC: CPT

## 2022-04-04 PROCEDURE — 84439 ASSAY OF FREE THYROXINE: CPT

## 2022-04-04 PROCEDURE — 36415 COLL VENOUS BLD VENIPUNCTURE: CPT

## 2022-04-04 PROCEDURE — 80053 COMPREHEN METABOLIC PANEL: CPT

## 2022-04-04 PROCEDURE — 99397 PER PM REEVAL EST PAT 65+ YR: CPT | Performed by: FAMILY MEDICINE

## 2022-04-04 PROCEDURE — 3078F DIAST BP <80 MM HG: CPT | Performed by: FAMILY MEDICINE

## 2022-04-04 PROCEDURE — 84443 ASSAY THYROID STIM HORMONE: CPT

## 2022-04-04 PROCEDURE — 82607 VITAMIN B-12: CPT

## 2022-04-04 PROCEDURE — 80061 LIPID PANEL: CPT

## 2022-04-04 PROCEDURE — 83036 HEMOGLOBIN GLYCOSYLATED A1C: CPT

## 2022-04-04 PROCEDURE — 86769 SARS-COV-2 COVID-19 ANTIBODY: CPT

## 2022-04-04 PROCEDURE — 3008F BODY MASS INDEX DOCD: CPT | Performed by: FAMILY MEDICINE

## 2022-04-04 PROCEDURE — 82306 VITAMIN D 25 HYDROXY: CPT

## 2022-05-09 ENCOUNTER — TELEPHONE (OUTPATIENT)
Dept: SURGERY | Facility: CLINIC | Age: 77
End: 2022-05-09

## 2022-05-09 NOTE — TELEPHONE ENCOUNTER
Pt's  called to say that pt is now seeing Dr. Kristy Epps. Sending request under Dr. Kristy Epps for medication.

## 2022-05-09 NOTE — TELEPHONE ENCOUNTER
Pt called stating she needs medication for ATORVASTATIN 80 MG Oral Tab.      [facility] [address], [address] [place] [address] Daniel Aggie [phone], [phone]

## 2022-05-10 RX ORDER — ATORVASTATIN CALCIUM 80 MG/1
TABLET, FILM COATED ORAL
Qty: 90 TABLET | Refills: 2 | Status: SHIPPED | OUTPATIENT
Start: 2022-05-10

## 2022-05-18 ENCOUNTER — HOSPITAL ENCOUNTER (OUTPATIENT)
Dept: CV DIAGNOSTICS | Facility: HOSPITAL | Age: 77
Discharge: HOME | End: 2022-05-18
Attending: FAMILY MEDICINE
Payer: MEDICARE

## 2022-05-18 DIAGNOSIS — M79.89 LOCALIZED SWELLING OF BOTH LOWER EXTREMITIES: ICD-10-CM

## 2022-05-18 PROCEDURE — 93306 TTE W/DOPPLER COMPLETE: CPT | Performed by: FAMILY MEDICINE

## 2022-07-19 ENCOUNTER — TELEPHONE (OUTPATIENT)
Dept: FAMILY MEDICINE CLINIC | Facility: CLINIC | Age: 77
End: 2022-07-19

## 2022-07-20 NOTE — TELEPHONE ENCOUNTER
Spoke with patient , he states symptoms started yesterday,   Slight resp issues, runny nose, cough mild  Yesterday had chills even though it was hot outside. Discussed questions with , educated on antibody infusion, discussed locations offering infusion if he does not want to take her to ER.

## 2022-07-21 ENCOUNTER — TELEMEDICINE (OUTPATIENT)
Dept: FAMILY MEDICINE CLINIC | Facility: CLINIC | Age: 77
End: 2022-07-21
Payer: MEDICARE

## 2022-07-21 DIAGNOSIS — B34.2 CORONAVIRUS INFECTION: Primary | ICD-10-CM

## 2022-07-21 PROCEDURE — 99213 OFFICE O/P EST LOW 20 MIN: CPT | Performed by: FAMILY MEDICINE

## 2022-07-21 RX ORDER — FLUTICASONE PROPIONATE 50 MCG
2 SPRAY, SUSPENSION (ML) NASAL DAILY
Qty: 1 EACH | Refills: 1 | Status: SHIPPED | OUTPATIENT
Start: 2022-07-21

## 2022-07-21 RX ORDER — BENZONATATE 200 MG/1
200 CAPSULE ORAL EVERY 8 HOURS PRN
Qty: 30 CAPSULE | Refills: 0 | Status: SHIPPED | OUTPATIENT
Start: 2022-07-21

## 2022-11-25 ENCOUNTER — NURSE ONLY (OUTPATIENT)
Dept: FAMILY MEDICINE CLINIC | Facility: CLINIC | Age: 77
End: 2022-11-25
Payer: MEDICARE

## 2022-11-25 DIAGNOSIS — Z23 NEED FOR VACCINATION: Primary | ICD-10-CM

## 2022-11-25 PROCEDURE — G0008 ADMIN INFLUENZA VIRUS VAC: HCPCS | Performed by: FAMILY MEDICINE

## 2022-11-25 PROCEDURE — 90662 IIV NO PRSV INCREASED AG IM: CPT | Performed by: FAMILY MEDICINE

## 2023-01-20 RX ORDER — ATORVASTATIN CALCIUM 80 MG/1
TABLET, FILM COATED ORAL
Qty: 30 TABLET | Refills: 0 | Status: SHIPPED | OUTPATIENT
Start: 2023-01-20

## 2023-04-12 ENCOUNTER — OFFICE VISIT (OUTPATIENT)
Dept: FAMILY MEDICINE CLINIC | Facility: CLINIC | Age: 78
End: 2023-04-12
Payer: MEDICARE

## 2023-04-12 VITALS
RESPIRATION RATE: 16 BRPM | BODY MASS INDEX: 27.64 KG/M2 | HEART RATE: 86 BPM | HEIGHT: 63 IN | DIASTOLIC BLOOD PRESSURE: 78 MMHG | WEIGHT: 156 LBS | OXYGEN SATURATION: 98 % | SYSTOLIC BLOOD PRESSURE: 128 MMHG

## 2023-04-12 DIAGNOSIS — R73.9 HYPERGLYCEMIA: ICD-10-CM

## 2023-04-12 DIAGNOSIS — Z00.00 ENCOUNTER FOR ANNUAL HEALTH EXAMINATION: Primary | ICD-10-CM

## 2023-04-12 DIAGNOSIS — Z86.73 HISTORY OF ARTERIAL ISCHEMIC STROKE: ICD-10-CM

## 2023-04-12 DIAGNOSIS — Z13.29 SCREENING FOR THYROID DISORDER: ICD-10-CM

## 2023-04-12 DIAGNOSIS — R47.01 EXPRESSIVE APHASIA: ICD-10-CM

## 2023-04-12 DIAGNOSIS — Z13.0 SCREENING FOR DEFICIENCY ANEMIA: ICD-10-CM

## 2023-04-12 DIAGNOSIS — L71.9 ACNE ROSACEA: ICD-10-CM

## 2023-04-12 DIAGNOSIS — I10 PRIMARY HYPERTENSION: ICD-10-CM

## 2023-04-12 DIAGNOSIS — F09 COGNITIVE DISORDER: ICD-10-CM

## 2023-04-12 DIAGNOSIS — I87.2 VENOUS INSUFFICIENCY: ICD-10-CM

## 2023-04-12 DIAGNOSIS — E78.5 DYSLIPIDEMIA: ICD-10-CM

## 2023-04-12 PROBLEM — I69.951: Status: RESOLVED | Noted: 2022-04-04 | Resolved: 2023-04-12

## 2023-04-12 PROCEDURE — G0439 PPPS, SUBSEQ VISIT: HCPCS | Performed by: FAMILY MEDICINE

## 2023-04-12 PROCEDURE — 3078F DIAST BP <80 MM HG: CPT | Performed by: FAMILY MEDICINE

## 2023-04-12 PROCEDURE — 3008F BODY MASS INDEX DOCD: CPT | Performed by: FAMILY MEDICINE

## 2023-04-12 PROCEDURE — 1125F AMNT PAIN NOTED PAIN PRSNT: CPT | Performed by: FAMILY MEDICINE

## 2023-04-12 PROCEDURE — 96160 PT-FOCUSED HLTH RISK ASSMT: CPT | Performed by: FAMILY MEDICINE

## 2023-04-12 PROCEDURE — 3074F SYST BP LT 130 MM HG: CPT | Performed by: FAMILY MEDICINE

## 2023-04-12 RX ORDER — METRONIDAZOLE 7.5 MG/G
GEL TOPICAL
Qty: 45 G | Status: SHIPPED | OUTPATIENT
Start: 2023-04-12

## 2023-04-13 PROBLEM — G45.9 TIA (TRANSIENT ISCHEMIC ATTACK): Status: RESOLVED | Noted: 2021-03-06 | Resolved: 2023-04-13

## 2023-04-18 RX ORDER — ATORVASTATIN CALCIUM 80 MG/1
TABLET, FILM COATED ORAL
Qty: 30 TABLET | Refills: 0 | OUTPATIENT
Start: 2023-04-18

## 2023-04-19 RX ORDER — ATORVASTATIN CALCIUM 80 MG/1
80 TABLET, FILM COATED ORAL NIGHTLY
Qty: 90 TABLET | Refills: 0 | Status: SHIPPED | OUTPATIENT
Start: 2023-04-19

## 2023-04-19 NOTE — TELEPHONE ENCOUNTER
Per Dr Chhaya Corrigan. Coretta Carpenter   Has not been seen in over a year; needs appt and need discussion to see if needs to continue - if not wanting to follow up, defer to PCP     Vadim msg sent to pt notifying of response

## 2023-04-19 NOTE — TELEPHONE ENCOUNTER
Last lipid: 4/4/22: WNL.   Last OV: 4/12/23    RH, are you willing to refill in place of neurologist?

## 2023-04-19 NOTE — TELEPHONE ENCOUNTER
Pt  calling to see if Dr. Abhinav Santiago will fill pt's atorvastatin. Pt was seen on 04/12/23  for her MA super visit.    Dr. Jerri Degroot office will not fill because she has not seen him in over 1 year     Please send to [facility] on [address] and let pt  know if we can fill

## 2023-04-25 ENCOUNTER — TELEPHONE (OUTPATIENT)
Dept: FAMILY MEDICINE CLINIC | Facility: CLINIC | Age: 78
End: 2023-04-25

## 2023-04-25 NOTE — TELEPHONE ENCOUNTER
Reached patient's , Tomer Morton, for medication adherence consult (okay per HIPAA). Patient overdue for refill on atorvastatin per insurance report. Patient recently refilled atorvastatin on 4/19 x 90 day supply. Patient's  reports that patient has been taking her atorvastatin as prescribed. He reports that she is tolerating the medication well. Provided education on importance of adherence for optimal benefit. Patient's  denies any questions or concerns with medication.

## 2023-07-24 RX ORDER — ATORVASTATIN CALCIUM 80 MG/1
80 TABLET, FILM COATED ORAL NIGHTLY
Qty: 90 TABLET | Refills: 0 | Status: SHIPPED | OUTPATIENT
Start: 2023-07-24

## 2023-07-24 NOTE — TELEPHONE ENCOUNTER
.A refill request was received for:  Requested Prescriptions     Pending Prescriptions Disp Refills    ATORVASTATIN 80 MG Oral Tab [Pharmacy Med Name: Atorvastatin Calcium 80 Mg Tab Nort] 90 tablet 0     Sig: Take 1 tablet (80 mg total) by mouth nightly. Last refill date:   4/19/2023    Last office visit: 4/12/2023    Follow up due:  No future appointments.

## 2023-07-27 ENCOUNTER — LAB ENCOUNTER (OUTPATIENT)
Dept: LAB | Age: 78
End: 2023-07-27
Attending: FAMILY MEDICINE
Payer: MEDICARE

## 2023-07-27 ENCOUNTER — HOSPITAL ENCOUNTER (OUTPATIENT)
Age: 78
Discharge: HOME | End: 2023-07-27
Payer: MEDICARE

## 2023-07-27 VITALS
WEIGHT: 154.31 LBS | RESPIRATION RATE: 18 BRPM | SYSTOLIC BLOOD PRESSURE: 139 MMHG | DIASTOLIC BLOOD PRESSURE: 75 MMHG | TEMPERATURE: 98 F | OXYGEN SATURATION: 98 % | HEART RATE: 86 BPM | BODY MASS INDEX: 27 KG/M2

## 2023-07-27 DIAGNOSIS — E78.5 DYSLIPIDEMIA: ICD-10-CM

## 2023-07-27 DIAGNOSIS — Z13.29 SCREENING FOR THYROID DISORDER: ICD-10-CM

## 2023-07-27 DIAGNOSIS — Z13.0 SCREENING FOR DEFICIENCY ANEMIA: ICD-10-CM

## 2023-07-27 DIAGNOSIS — R30.0 DYSURIA: Primary | ICD-10-CM

## 2023-07-27 DIAGNOSIS — I10 PRIMARY HYPERTENSION: ICD-10-CM

## 2023-07-27 DIAGNOSIS — R73.9 HYPERGLYCEMIA: ICD-10-CM

## 2023-07-27 LAB
ALBUMIN SERPL-MCNC: 3.4 G/DL (ref 3.4–5)
ALBUMIN/GLOB SERPL: 0.9 {RATIO} (ref 1–2)
ALP LIVER SERPL-CCNC: 73 U/L
ALT SERPL-CCNC: 20 U/L
ANION GAP SERPL CALC-SCNC: 4 MMOL/L (ref 0–18)
AST SERPL-CCNC: 13 U/L (ref 15–37)
BASOPHILS # BLD AUTO: 0.07 X10(3) UL (ref 0–0.2)
BASOPHILS NFR BLD AUTO: 1.1 %
BILIRUB SERPL-MCNC: 0.5 MG/DL (ref 0.1–2)
BUN BLD-MCNC: 17 MG/DL (ref 7–18)
CALCIUM BLD-MCNC: 9.3 MG/DL (ref 8.5–10.1)
CHLORIDE SERPL-SCNC: 109 MMOL/L (ref 98–112)
CHOLEST SERPL-MCNC: 120 MG/DL (ref ?–200)
CO2 SERPL-SCNC: 27 MMOL/L (ref 21–32)
CREAT BLD-MCNC: 1.02 MG/DL
EGFRCR SERPLBLD CKD-EPI 2021: 56 ML/MIN/1.73M2 (ref 60–?)
EOSINOPHIL # BLD AUTO: 0.17 X10(3) UL (ref 0–0.7)
EOSINOPHIL NFR BLD AUTO: 2.6 %
ERYTHROCYTE [DISTWIDTH] IN BLOOD BY AUTOMATED COUNT: 15.1 %
EST. AVERAGE GLUCOSE BLD GHB EST-MCNC: 146 MG/DL (ref 68–126)
FASTING PATIENT LIPID ANSWER: YES
FASTING STATUS PATIENT QL REPORTED: YES
GLOBULIN PLAS-MCNC: 3.6 G/DL (ref 2.8–4.4)
GLUCOSE BLD-MCNC: 96 MG/DL (ref 70–99)
HBA1C MFR BLD: 6.7 % (ref ?–5.7)
HCT VFR BLD AUTO: 42.6 %
HDLC SERPL-MCNC: 58 MG/DL (ref 40–59)
HGB BLD-MCNC: 13.6 G/DL
IMM GRANULOCYTES # BLD AUTO: 0.04 X10(3) UL (ref 0–1)
IMM GRANULOCYTES NFR BLD: 0.6 %
LDLC SERPL CALC-MCNC: 45 MG/DL (ref ?–100)
LYMPHOCYTES # BLD AUTO: 1.29 X10(3) UL (ref 1–4)
LYMPHOCYTES NFR BLD AUTO: 19.8 %
MCH RBC QN AUTO: 27.9 PG (ref 26–34)
MCHC RBC AUTO-ENTMCNC: 31.9 G/DL (ref 31–37)
MCV RBC AUTO: 87.5 FL
MONOCYTES # BLD AUTO: 0.55 X10(3) UL (ref 0.1–1)
MONOCYTES NFR BLD AUTO: 8.4 %
NEUTROPHILS # BLD AUTO: 4.39 X10 (3) UL (ref 1.5–7.7)
NEUTROPHILS # BLD AUTO: 4.39 X10(3) UL (ref 1.5–7.7)
NEUTROPHILS NFR BLD AUTO: 67.5 %
NONHDLC SERPL-MCNC: 62 MG/DL (ref ?–130)
OSMOLALITY SERPL CALC.SUM OF ELEC: 291 MOSM/KG (ref 275–295)
PLATELET # BLD AUTO: 263 10(3)UL (ref 150–450)
POCT BILIRUBIN URINE: NEGATIVE
POCT GLUCOSE URINE: NEGATIVE MG/DL
POCT KETONE URINE: NEGATIVE MG/DL
POCT NITRITE URINE: POSITIVE
POCT PH URINE: 6 (ref 5–8)
POCT PROTEIN URINE: NEGATIVE MG/DL
POCT SPECIFIC GRAVITY URINE: 1.01
POCT UROBILINOGEN URINE: 0.2 MG/DL
POTASSIUM SERPL-SCNC: 3.9 MMOL/L (ref 3.5–5.1)
PROT SERPL-MCNC: 7 G/DL (ref 6.4–8.2)
RBC # BLD AUTO: 4.87 X10(6)UL
SODIUM SERPL-SCNC: 140 MMOL/L (ref 136–145)
TRIGL SERPL-MCNC: 92 MG/DL (ref 30–149)
TSI SER-ACNC: 0.76 MIU/ML (ref 0.36–3.74)
VLDLC SERPL CALC-MCNC: 13 MG/DL (ref 0–30)
WBC # BLD AUTO: 6.5 X10(3) UL (ref 4–11)

## 2023-07-27 PROCEDURE — 99213 OFFICE O/P EST LOW 20 MIN: CPT | Performed by: NURSE PRACTITIONER

## 2023-07-27 PROCEDURE — 80053 COMPREHEN METABOLIC PANEL: CPT

## 2023-07-27 PROCEDURE — 85025 COMPLETE CBC W/AUTO DIFF WBC: CPT

## 2023-07-27 PROCEDURE — 36415 COLL VENOUS BLD VENIPUNCTURE: CPT

## 2023-07-27 PROCEDURE — 80061 LIPID PANEL: CPT

## 2023-07-27 PROCEDURE — 81002 URINALYSIS NONAUTO W/O SCOPE: CPT | Performed by: NURSE PRACTITIONER

## 2023-07-27 PROCEDURE — 83036 HEMOGLOBIN GLYCOSYLATED A1C: CPT

## 2023-07-27 PROCEDURE — 84443 ASSAY THYROID STIM HORMONE: CPT

## 2023-07-27 RX ORDER — CIPROFLOXACIN 500 MG/1
500 TABLET, FILM COATED ORAL 2 TIMES DAILY
Qty: 14 TABLET | Refills: 0 | Status: SHIPPED | OUTPATIENT
Start: 2023-07-27 | End: 2023-08-03

## 2023-07-30 ENCOUNTER — PATIENT MESSAGE (OUTPATIENT)
Dept: FAMILY MEDICINE CLINIC | Facility: CLINIC | Age: 78
End: 2023-07-30

## 2023-07-31 NOTE — TELEPHONE ENCOUNTER
The antibiotic is appropriate, as long as symptoms improving, and no new symptoms no concerns.     Parris Padgett MD

## 2023-07-31 NOTE — TELEPHONE ENCOUNTER
From: Sheryle Corin  To: Eliseo Kim MD  Sent: 7/30/2023 10:45 AM CDT  Subject: Question regarding URINE CULTURE, ROUTINE    We will travel briefly this weekend Friday to Sunday. Should we come in.  Sherlyn feels ok and is taking cip

## 2023-08-01 NOTE — TELEPHONE ENCOUNTER
RH-  Please see updated response/question from patient and advice. oriented to person, place, time and situation

## 2023-08-25 ENCOUNTER — HOSPITAL ENCOUNTER (INPATIENT)
Facility: HOSPITAL | Age: 78
LOS: 1 days | Discharge: HOME | End: 2023-08-26
Attending: EMERGENCY MEDICINE | Admitting: HOSPITALIST
Payer: MEDICARE

## 2023-08-25 ENCOUNTER — APPOINTMENT (OUTPATIENT)
Dept: CT IMAGING | Facility: HOSPITAL | Age: 78
End: 2023-08-25
Attending: EMERGENCY MEDICINE
Payer: MEDICARE

## 2023-08-25 ENCOUNTER — HOSPITAL ENCOUNTER (OUTPATIENT)
Facility: HOSPITAL | Age: 78
Setting detail: OBSERVATION
Discharge: HOME | End: 2023-08-26
Attending: EMERGENCY MEDICINE | Admitting: HOSPITALIST
Payer: MEDICARE

## 2023-08-25 DIAGNOSIS — I60.9 SUBARACHNOID HEMORRHAGE (HCC): Primary | ICD-10-CM

## 2023-08-25 DIAGNOSIS — S01.81XA FACIAL LACERATION, INITIAL ENCOUNTER: ICD-10-CM

## 2023-08-25 DIAGNOSIS — I62.00 SUBDURAL HEMORRHAGE (HCC): ICD-10-CM

## 2023-08-25 LAB
ALBUMIN SERPL-MCNC: 3.5 G/DL (ref 3.4–5)
ALBUMIN/GLOB SERPL: 1.1 {RATIO} (ref 1–2)
ALP LIVER SERPL-CCNC: 74 U/L
ALT SERPL-CCNC: 24 U/L
ANION GAP SERPL CALC-SCNC: 7 MMOL/L (ref 0–18)
AST SERPL-CCNC: 17 U/L (ref 15–37)
BASOPHILS # BLD AUTO: 0.05 X10(3) UL (ref 0–0.2)
BASOPHILS NFR BLD AUTO: 0.7 %
BILIRUB SERPL-MCNC: 0.3 MG/DL (ref 0.1–2)
BILIRUB UR QL STRIP.AUTO: NEGATIVE
BUN BLD-MCNC: 19 MG/DL (ref 7–18)
CALCIUM BLD-MCNC: 10 MG/DL (ref 8.5–10.1)
CHLORIDE SERPL-SCNC: 111 MMOL/L (ref 98–112)
CO2 SERPL-SCNC: 23 MMOL/L (ref 21–32)
CREAT BLD-MCNC: 0.93 MG/DL
EGFRCR SERPLBLD CKD-EPI 2021: 63 ML/MIN/1.73M2 (ref 60–?)
EOSINOPHIL # BLD AUTO: 0.14 X10(3) UL (ref 0–0.7)
EOSINOPHIL NFR BLD AUTO: 2 %
ERYTHROCYTE [DISTWIDTH] IN BLOOD BY AUTOMATED COUNT: 14.8 %
GLOBULIN PLAS-MCNC: 3.3 G/DL (ref 2.8–4.4)
GLUCOSE BLD-MCNC: 101 MG/DL (ref 70–99)
GLUCOSE BLD-MCNC: 110 MG/DL (ref 70–99)
GLUCOSE UR STRIP.AUTO-MCNC: NORMAL MG/DL
HCT VFR BLD AUTO: 41.7 %
HGB BLD-MCNC: 13.2 G/DL
IMM GRANULOCYTES # BLD AUTO: 0.04 X10(3) UL (ref 0–1)
IMM GRANULOCYTES NFR BLD: 0.6 %
KETONES UR STRIP.AUTO-MCNC: NEGATIVE MG/DL
LEUKOCYTE ESTERASE UR QL STRIP.AUTO: 500
LYMPHOCYTES # BLD AUTO: 1.86 X10(3) UL (ref 1–4)
LYMPHOCYTES NFR BLD AUTO: 26.6 %
MCH RBC QN AUTO: 28 PG (ref 26–34)
MCHC RBC AUTO-ENTMCNC: 31.7 G/DL (ref 31–37)
MCV RBC AUTO: 88.3 FL
MONOCYTES # BLD AUTO: 0.71 X10(3) UL (ref 0.1–1)
MONOCYTES NFR BLD AUTO: 10.2 %
NEUTROPHILS # BLD AUTO: 4.19 X10 (3) UL (ref 1.5–7.7)
NEUTROPHILS # BLD AUTO: 4.19 X10(3) UL (ref 1.5–7.7)
NEUTROPHILS NFR BLD AUTO: 59.9 %
NITRITE UR QL STRIP.AUTO: NEGATIVE
OSMOLALITY SERPL CALC.SUM OF ELEC: 294 MOSM/KG (ref 275–295)
PH UR STRIP.AUTO: 6 [PH] (ref 5–8)
PLATELET # BLD AUTO: 226 10(3)UL (ref 150–450)
POTASSIUM SERPL-SCNC: 3.8 MMOL/L (ref 3.5–5.1)
PROT SERPL-MCNC: 6.8 G/DL (ref 6.4–8.2)
PROT UR STRIP.AUTO-MCNC: NEGATIVE MG/DL
RBC # BLD AUTO: 4.72 X10(6)UL
RBC #/AREA URNS AUTO: >10 /HPF
SODIUM SERPL-SCNC: 141 MMOL/L (ref 136–145)
SP GR UR STRIP.AUTO: 1.01 (ref 1–1.03)
UROBILINOGEN UR STRIP.AUTO-MCNC: NORMAL MG/DL
WBC # BLD AUTO: 7 X10(3) UL (ref 4–11)
WBC #/AREA URNS AUTO: >50 /HPF
YEAST UR QL: PRESENT /HPF

## 2023-08-25 PROCEDURE — 70450 CT HEAD/BRAIN W/O DYE: CPT | Performed by: EMERGENCY MEDICINE

## 2023-08-25 PROCEDURE — 72125 CT NECK SPINE W/O DYE: CPT | Performed by: EMERGENCY MEDICINE

## 2023-08-25 PROCEDURE — 0HQ0XZZ REPAIR SCALP SKIN, EXTERNAL APPROACH: ICD-10-PCS | Performed by: EMERGENCY MEDICINE

## 2023-08-25 PROCEDURE — 3E0234Z INTRODUCTION OF SERUM, TOXOID AND VACCINE INTO MUSCLE, PERCUTANEOUS APPROACH: ICD-10-PCS | Performed by: EMERGENCY MEDICINE

## 2023-08-25 PROCEDURE — 99223 1ST HOSP IP/OBS HIGH 75: CPT | Performed by: HOSPITALIST

## 2023-08-25 RX ORDER — ATORVASTATIN CALCIUM 80 MG/1
80 TABLET, FILM COATED ORAL NIGHTLY
Status: DISCONTINUED | OUTPATIENT
Start: 2023-08-26 | End: 2023-08-26

## 2023-08-25 RX ORDER — HYDRALAZINE HYDROCHLORIDE 20 MG/ML
10 INJECTION INTRAMUSCULAR; INTRAVENOUS EVERY 2 HOUR PRN
Status: DISCONTINUED | OUTPATIENT
Start: 2023-08-25 | End: 2023-08-26

## 2023-08-25 RX ORDER — LABETALOL HYDROCHLORIDE 5 MG/ML
10 INJECTION, SOLUTION INTRAVENOUS EVERY 10 MIN PRN
Status: DISCONTINUED | OUTPATIENT
Start: 2023-08-25 | End: 2023-08-26

## 2023-08-25 RX ORDER — LORAZEPAM 2 MG/ML
1 INJECTION INTRAMUSCULAR
Status: DISCONTINUED | OUTPATIENT
Start: 2023-08-25 | End: 2023-08-26

## 2023-08-25 RX ORDER — ONDANSETRON 2 MG/ML
8 INJECTION INTRAMUSCULAR; INTRAVENOUS EVERY 6 HOURS PRN
Status: DISCONTINUED | OUTPATIENT
Start: 2023-08-25 | End: 2023-08-25

## 2023-08-25 RX ORDER — ACETAMINOPHEN 650 MG/1
650 SUPPOSITORY RECTAL EVERY 4 HOURS PRN
Status: DISCONTINUED | OUTPATIENT
Start: 2023-08-25 | End: 2023-08-26

## 2023-08-25 RX ORDER — METOCLOPRAMIDE HYDROCHLORIDE 5 MG/ML
10 INJECTION INTRAMUSCULAR; INTRAVENOUS EVERY 8 HOURS PRN
Status: DISCONTINUED | OUTPATIENT
Start: 2023-08-25 | End: 2023-08-26

## 2023-08-25 RX ORDER — BISACODYL 10 MG
10 SUPPOSITORY, RECTAL RECTAL
Status: DISCONTINUED | OUTPATIENT
Start: 2023-08-25 | End: 2023-08-26

## 2023-08-25 RX ORDER — BENZONATATE 100 MG/1
200 CAPSULE ORAL 3 TIMES DAILY PRN
Status: DISCONTINUED | OUTPATIENT
Start: 2023-08-25 | End: 2023-08-26

## 2023-08-25 RX ORDER — MELATONIN
3 NIGHTLY PRN
Status: DISCONTINUED | OUTPATIENT
Start: 2023-08-25 | End: 2023-08-26

## 2023-08-25 RX ORDER — ACETAMINOPHEN 325 MG/1
650 TABLET ORAL EVERY 4 HOURS PRN
Status: DISCONTINUED | OUTPATIENT
Start: 2023-08-25 | End: 2023-08-26

## 2023-08-25 RX ORDER — SODIUM CHLORIDE 9 MG/ML
INJECTION, SOLUTION INTRAVENOUS CONTINUOUS
Status: DISCONTINUED | OUTPATIENT
Start: 2023-08-26 | End: 2023-08-26

## 2023-08-25 RX ORDER — ACETAMINOPHEN 500 MG
500 TABLET ORAL EVERY 4 HOURS PRN
Status: DISCONTINUED | OUTPATIENT
Start: 2023-08-25 | End: 2023-08-26

## 2023-08-25 RX ORDER — SENNOSIDES 8.6 MG
17.2 TABLET ORAL NIGHTLY PRN
Status: DISCONTINUED | OUTPATIENT
Start: 2023-08-25 | End: 2023-08-26

## 2023-08-25 RX ORDER — ONDANSETRON 2 MG/ML
4 INJECTION INTRAMUSCULAR; INTRAVENOUS EVERY 6 HOURS PRN
Status: DISCONTINUED | OUTPATIENT
Start: 2023-08-25 | End: 2023-08-26

## 2023-08-25 RX ORDER — POLYETHYLENE GLYCOL 3350 17 G/17G
17 POWDER, FOR SOLUTION ORAL DAILY PRN
Status: DISCONTINUED | OUTPATIENT
Start: 2023-08-25 | End: 2023-08-26

## 2023-08-25 RX ORDER — ECHINACEA PURPUREA EXTRACT 125 MG
1 TABLET ORAL
Status: DISCONTINUED | OUTPATIENT
Start: 2023-08-25 | End: 2023-08-26

## 2023-08-25 NOTE — ED INITIAL ASSESSMENT (HPI)
Pt arrives from home via EMS after falling at home. Per report, it was an unwitnessed fall. Pt was found in the garage per . Unknown LOC, Pt arrives with c-collar in placed. Lac to right eyebrow, bleeding controled at this time. Pt AO x 1, oriented to person only. Confused per EMS. Pt has hx of stroke.

## 2023-08-26 ENCOUNTER — APPOINTMENT (OUTPATIENT)
Dept: CT IMAGING | Facility: HOSPITAL | Age: 78
End: 2023-08-26
Attending: HOSPITALIST
Payer: MEDICARE

## 2023-08-26 ENCOUNTER — TELEPHONE (OUTPATIENT)
Dept: SURGERY | Facility: CLINIC | Age: 78
End: 2023-08-26

## 2023-08-26 VITALS
DIASTOLIC BLOOD PRESSURE: 66 MMHG | BODY MASS INDEX: 24.99 KG/M2 | HEIGHT: 65 IN | OXYGEN SATURATION: 98 % | HEART RATE: 72 BPM | TEMPERATURE: 98 F | WEIGHT: 150 LBS | SYSTOLIC BLOOD PRESSURE: 142 MMHG | RESPIRATION RATE: 18 BRPM

## 2023-08-26 DIAGNOSIS — S06.5XAA SDH (SUBDURAL HEMATOMA) (HCC): Primary | ICD-10-CM

## 2023-08-26 LAB
ANION GAP SERPL CALC-SCNC: 6 MMOL/L (ref 0–18)
ATRIAL RATE: 95 BPM
BUN BLD-MCNC: 15 MG/DL (ref 7–18)
CALCIUM BLD-MCNC: 9 MG/DL (ref 8.5–10.1)
CHLORIDE SERPL-SCNC: 112 MMOL/L (ref 98–112)
CO2 SERPL-SCNC: 24 MMOL/L (ref 21–32)
CREAT BLD-MCNC: 0.64 MG/DL
EGFRCR SERPLBLD CKD-EPI 2021: 90 ML/MIN/1.73M2 (ref 60–?)
ERYTHROCYTE [DISTWIDTH] IN BLOOD BY AUTOMATED COUNT: 14.9 %
GLUCOSE BLD-MCNC: 109 MG/DL (ref 70–99)
GLUCOSE BLD-MCNC: 115 MG/DL (ref 70–99)
GLUCOSE BLD-MCNC: 85 MG/DL (ref 70–99)
HCT VFR BLD AUTO: 39 %
HGB BLD-MCNC: 12.3 G/DL
MCH RBC QN AUTO: 27.6 PG (ref 26–34)
MCHC RBC AUTO-ENTMCNC: 31.5 G/DL (ref 31–37)
MCV RBC AUTO: 87.6 FL
OSMOLALITY SERPL CALC.SUM OF ELEC: 296 MOSM/KG (ref 275–295)
P AXIS: 48 DEGREES
P-R INTERVAL: 190 MS
PLATELET # BLD AUTO: 209 10(3)UL (ref 150–450)
POTASSIUM SERPL-SCNC: 3.7 MMOL/L (ref 3.5–5.1)
Q-T INTERVAL: 356 MS
QRS DURATION: 74 MS
QTC CALCULATION (BEZET): 447 MS
R AXIS: -1 DEGREES
RBC # BLD AUTO: 4.45 X10(6)UL
SODIUM SERPL-SCNC: 142 MMOL/L (ref 136–145)
T AXIS: 17 DEGREES
TROPONIN I HIGH SENSITIVITY: 5 NG/L
VENTRICULAR RATE: 95 BPM
WBC # BLD AUTO: 7.4 X10(3) UL (ref 4–11)

## 2023-08-26 PROCEDURE — 70450 CT HEAD/BRAIN W/O DYE: CPT | Performed by: HOSPITALIST

## 2023-08-26 PROCEDURE — 99239 HOSP IP/OBS DSCHRG MGMT >30: CPT | Performed by: INTERNAL MEDICINE

## 2023-08-26 RX ORDER — POTASSIUM CHLORIDE 1.5 G/1.58G
40 POWDER, FOR SOLUTION ORAL ONCE
Status: COMPLETED | OUTPATIENT
Start: 2023-08-26 | End: 2023-08-26

## 2023-08-26 NOTE — ED QUICK NOTES
Rounding Completed    Plan of Care reviewed. Waiting for transport to take patient up to her room. Elimination needs assessed. Bed is locked and in lowest position. Call light within reach.

## 2023-08-26 NOTE — PLAN OF CARE
Reviewed with  and pt the plan of care for today. To be discharged to home. Pt did very well with PT and pt feels back to baseline using cane. No c/o pain and in no apparent distress. To f/u with neuro surgery in 2 weeks and instructed to have a CT of head prior to appt. To have NS help set up that appt when they call for appt. To see primary md for suture removal and post hospital appt.  to go home and get clothes for pt's discharge this evening. Pt's neuro's intact and unchanged.

## 2023-08-26 NOTE — ED QUICK NOTES
Orders for admission, patient is aware of plan and ready to go upstairs. Any questions, please call ED RN Dedra Arellano at extension [phone]. Patient Covid vaccination status: Fully vaccinated     COVID Test Ordered in ED: None    COVID Suspicion at Admission: N/A    Running Infusions:  None    Mental Status/LOC at time of transport: AO x 1. Pt alert to person, confused. Global aphasia pt's norm per , residual from previous stroke. Incintinent.     Other pertinent information:   CIWA score: N/A   NIH score:  2

## 2023-08-26 NOTE — PLAN OF CARE
NURSING ADMISSION NOTE    Patient admitted via Cart  Oriented to room. Safety precautions initiated. Bed in low position. Call light in reach. Assumed care at approximately 0000. NSR on tele. RA. Alert to self and confused at baseline. Neuros q 4 per protocol. Chronic aphasia. No new neuro deficits, see flowsheets. Patient denies pain overnight. R eyebrow laceration with dried blood, RUBEN. Repeat head CT completed this AM, results discussed with Dr Phil Feliciano. Seizure precautions in place. Call light within reach. Neurology and PT/OT/SLP to see. Patient and family updated on plan of care.

## 2023-08-26 NOTE — SLP NOTE
ADULT SWALLOWING EVALUATION    ASSESSMENT    ASSESSMENT/OVERALL IMPRESSION:  The patient is a 63-year-old female with a history of CVA in the past, with chronic aphasia, oriented x1 at baseline, who lives at home with her , and was going out to the garage, and had mechanical fall, stumbling upon an uneven surface/stop, causing her to strike the right side of her temporal region. She is at her neurologic baseline which is AO x1. While this writer was in the room patient able to follow 1-2 step commands. She presented with paraphasic errors which are baseline. Patient able to communicate she had seen an SLP multiple times following past CVA and currently baseline. Oral motor mechanism exam revealed functional oral range, rate, and strength of oral musculature, intact dentition, and clear vocal quality. Strong cough on command. Assessed patient with thin liquids via spoon, cup, and straw, puree, and solids. Oral phase revealed functional oral acceptance, retrieval and mastication of solids with timely and complete clearing of the oral cavity. Pharyngeal phase revealed an apparent timely initiation of the pharyngeal phase with functional hyolaryngeal elevation on palpation. No coughing or throat clearing. Patient presents with functional oral phase and apparent functional pharyngeal phase free of overt clinical s/s of aspiration. Recommend regular diet and thin liquids. Will follow up to ensure safety with diet and educate pt on compensatory strategies/swallow precautions. Video swallow study to be completed if CXR declines, increase in clinical signs of aspiration, and/or MD desires. Hallman Assessment of Swallow Function Score: No abnormality detected    RECOMMENDATIONS   Diet Recommendations - Solids: Regular  Diet Recommendations - Liquids:  Thin Liquids          Aspiration Precautions: Upright position  Medication Administration Recommendations: No restrictions  Treatment Plan/Recommendations: Aspiration precautions  Discharge Recommendations/Plan: Undetermined    HISTORY   MEDICAL HISTORY  Reason for Referral: Stroke protocol    Problem List  Principal Problem:    Subarachnoid hemorrhage ([facility] [address]  Active Problems:    Subdural hemorrhage (HCC)    Facial laceration, initial encounter      Past Medical History  Past Medical History:   Diagnosis Date    Atrial septal aneurysm 2009    DJD (degenerative joint disease)     knee    High cholesterol     Hyperlipidemia    Hyperlipidemia 4/23/2014    Iliotibial band syndrome of left side 3/15/2016    Insomnia     Left-sided low back pain without sciatica 3/15/2016    Multinodular goiter 7/1/2011    multinodular goiter/thyroid cyst    Obese 2006    Obesity     Osteoarthritis     L knee    Stroke (cerebrum) ([facility] [address] 03/2020    Tendinitis     Thyroid nodule        Prior Living Situation: Home with support  Diet Prior to Admission: Regular; Thin liquids  Precautions: None    Patient/Family Goals: To eat and drink    SWALLOWING HISTORY  Current Diet Consistency: Regular; Thin liquids  Dysphagia History: No past history reported  Imaging Results:   1. There is acute subdural hemorrhage at the posterior falx and superior left tentorium which measures up to 8 mm in thickness in the coronal plane. This appears mildly worse in thickness since the earlier head CT. A component of this may represent redistribution of the subdural blood products. Continued imaging follow-up is recommended. 2. There is a mild amount of scattered acute subarachnoid hemorrhage within bilateral frontal and left parietal sulci. The extent of this acute subarachnoid hemorrhage is similar since 8/25/2023.      3. There is a probable meningioma at the posterior left falx measuring 8 mm, not significantly changed. 4. There is a moderate size region of encephalomalacia noted at the lateral left temporal lobe and left temporal parietal junction.   There is a background of mild chronic appearing small vessel ischemic disease. If there is clinical concern for acute ischemia/infarction, an MRI of the brain would be recommended for further evaluation. 5. No hydrocephalus, significant mass effect, or midline shift. SUBJECTIVE       OBJECTIVE   ORAL MOTOR EXAMINATION  Dentition: Natural;Functional  Symmetry: Within Functional Limits  Strength: Within Functional Limits  Tone: Within Functional Limits  Range of Motion: Within Functional Limits  Rate of Motion: Within Functional Limits    Voice Quality: Clear  Respiratory Status: Unlabored  Consistencies Trialed: Thin liquids;Puree;Hard solid  Method of Presentation: Self presentation;Spoon;Cup;Straw;Consecutive swallows  Patient Positioning: Upright;Midline    Oral Phase of Swallow: Within Functional Limits      Pharyngeal Phase of Swallow: Within Functional Limits           (Please note: Silent aspiration cannot be evaluated clinically. Videofluoroscopic Swallow Study is required to rule-out silent aspiration.)    Esophageal Phase of Swallow: No complaints consistent with possible esophageal involvement  Comments;              GOALS  Goal #1 The patient will tolerate regular consistency and thin liquids without overt signs or symptoms of aspiration with 95 % accuracy over 1-2 session(s).   In Progress     FOLLOW UP  Treatment Plan/Recommendations: Aspiration precautions  Number of Visits to Meet Established Goals: 1  Follow Up Needed (Documentation Required): Yes  SLP Follow-up Date: 08/28/23    Thank you for your referral.   If you have any questions, please contact [facility]

## 2023-08-26 NOTE — DISCHARGE INSTRUCTIONS
Hold Aspirin until seen by Dr Sherrell Cote (neuro surgery Dr.). When you call to make follow appointment with Dr Sherrell Cote, tell them you need a CT of head before appointment and they can help you schedule. OK to shower. OK to wash hair gently. Pat dry. Have sutures taken out by your Primary Dr in 10-14 days. Call to make an appointment.
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
## 2023-08-28 ENCOUNTER — PATIENT OUTREACH (OUTPATIENT)
Dept: CASE MANAGEMENT | Age: 78
End: 2023-08-28

## 2023-08-28 NOTE — PROGRESS NOTES
[facility] for post hospital follow up. [facility] contact information provided as well as [facility] office number, [phone].

## 2023-08-29 NOTE — PROGRESS NOTES
[facility]. [facility] contact information provided as well as [facility] office number, [phone].

## 2023-08-30 ENCOUNTER — PATIENT OUTREACH (OUTPATIENT)
Dept: CASE MANAGEMENT | Age: 78
End: 2023-08-30

## 2023-09-11 ENCOUNTER — OFFICE VISIT (OUTPATIENT)
Dept: FAMILY MEDICINE CLINIC | Facility: CLINIC | Age: 78
End: 2023-09-11
Payer: MEDICARE

## 2023-09-11 ENCOUNTER — HOSPITAL ENCOUNTER (OUTPATIENT)
Dept: CT IMAGING | Facility: HOSPITAL | Age: 78
Discharge: HOME | End: 2023-09-11
Attending: NURSE PRACTITIONER
Payer: MEDICARE

## 2023-09-11 VITALS
RESPIRATION RATE: 16 BRPM | SYSTOLIC BLOOD PRESSURE: 126 MMHG | DIASTOLIC BLOOD PRESSURE: 78 MMHG | OXYGEN SATURATION: 99 % | HEART RATE: 78 BPM | BODY MASS INDEX: 26.33 KG/M2 | HEIGHT: 65 IN | WEIGHT: 158 LBS

## 2023-09-11 DIAGNOSIS — I62.00 SUBDURAL HEMORRHAGE (HCC): ICD-10-CM

## 2023-09-11 DIAGNOSIS — R47.01 EXPRESSIVE APHASIA: ICD-10-CM

## 2023-09-11 DIAGNOSIS — I60.9 SUBARACHNOID HEMORRHAGE (HCC): ICD-10-CM

## 2023-09-11 DIAGNOSIS — Z48.02 VISIT FOR SUTURE REMOVAL: Primary | ICD-10-CM

## 2023-09-11 DIAGNOSIS — S06.5XAA SDH (SUBDURAL HEMATOMA) (HCC): ICD-10-CM

## 2023-09-11 PROCEDURE — 99213 OFFICE O/P EST LOW 20 MIN: CPT | Performed by: FAMILY MEDICINE

## 2023-09-11 PROCEDURE — 3078F DIAST BP <80 MM HG: CPT | Performed by: FAMILY MEDICINE

## 2023-09-11 PROCEDURE — 1159F MED LIST DOCD IN RCRD: CPT | Performed by: FAMILY MEDICINE

## 2023-09-11 PROCEDURE — 1160F RVW MEDS BY RX/DR IN RCRD: CPT | Performed by: FAMILY MEDICINE

## 2023-09-11 PROCEDURE — 1111F DSCHRG MED/CURRENT MED MERGE: CPT | Performed by: FAMILY MEDICINE

## 2023-09-11 PROCEDURE — 3074F SYST BP LT 130 MM HG: CPT | Performed by: FAMILY MEDICINE

## 2023-09-11 PROCEDURE — 70450 CT HEAD/BRAIN W/O DYE: CPT | Performed by: NURSE PRACTITIONER

## 2023-09-11 PROCEDURE — 3008F BODY MASS INDEX DOCD: CPT | Performed by: FAMILY MEDICINE

## 2023-09-16 DIAGNOSIS — I63.9 ACUTE CVA (CEREBROVASCULAR ACCIDENT) (HCC): ICD-10-CM

## 2023-09-18 RX ORDER — ASPIRIN 325 MG
325 TABLET, DELAYED RELEASE (ENTERIC COATED) ORAL DAILY
Qty: 90 TABLET | Refills: 0 | OUTPATIENT
Start: 2023-09-18

## 2023-09-18 RX ORDER — ATORVASTATIN CALCIUM 80 MG/1
80 TABLET, FILM COATED ORAL NIGHTLY
Qty: 90 TABLET | Refills: 0 | Status: SHIPPED | OUTPATIENT
Start: 2023-09-18

## 2023-11-07 ENCOUNTER — OFFICE VISIT (OUTPATIENT)
Dept: FAMILY MEDICINE CLINIC | Facility: CLINIC | Age: 78
End: 2023-11-07
Payer: MEDICARE

## 2023-11-07 VITALS
HEART RATE: 96 BPM | BODY MASS INDEX: 25.95 KG/M2 | WEIGHT: 152 LBS | RESPIRATION RATE: 16 BRPM | OXYGEN SATURATION: 97 % | DIASTOLIC BLOOD PRESSURE: 72 MMHG | HEIGHT: 64 IN | SYSTOLIC BLOOD PRESSURE: 148 MMHG

## 2023-11-07 DIAGNOSIS — I83.90 VARICOSE VEIN: ICD-10-CM

## 2023-11-07 DIAGNOSIS — R31.9 HEMATURIA, UNSPECIFIED TYPE: ICD-10-CM

## 2023-11-07 DIAGNOSIS — R45.86 MOOD CHANGES: ICD-10-CM

## 2023-11-07 DIAGNOSIS — Z86.73 HISTORY OF CVA (CEREBROVASCULAR ACCIDENT): ICD-10-CM

## 2023-11-07 DIAGNOSIS — N93.9 VAGINAL BLEEDING: Primary | ICD-10-CM

## 2023-11-07 DIAGNOSIS — E11.9 NEW ONSET TYPE 2 DIABETES MELLITUS (HCC): ICD-10-CM

## 2023-11-07 LAB
APPEARANCE: CLEAR
BILIRUBIN: NEGATIVE
CARTRIDGE EXPIRATION DATE: ABNORMAL DATE
CARTRIDGE LOT#: 612 NUMERIC
CREAT UR-SCNC: 53.3 MG/DL
GLUCOSE (URINE DIPSTICK): NEGATIVE MG/DL
HEMOGLOBIN A1C: 5.8 % (ref 4.3–5.6)
KETONES (URINE DIPSTICK): NEGATIVE MG/DL
LEUKOCYTES: NEGATIVE
MICROALBUMIN UR-MCNC: <0.5 MG/DL
MULTISTIX EXPIRATION DATE: NORMAL DATE
MULTISTIX LOT#: NORMAL NUMERIC
NITRITE, URINE: NEGATIVE
OCCULT BLOOD: NEGATIVE
PH, URINE: 6.5 (ref 4.5–8)
PROTEIN (URINE DIPSTICK): NEGATIVE MG/DL
SPECIFIC GRAVITY: 1.01 (ref 1–1.03)
URINE-COLOR: YELLOW
UROBILINOGEN,SEMI-QN: 0.2 MG/DL (ref 0–1.9)

## 2023-11-07 PROCEDURE — 1160F RVW MEDS BY RX/DR IN RCRD: CPT | Performed by: FAMILY MEDICINE

## 2023-11-07 PROCEDURE — 3078F DIAST BP <80 MM HG: CPT | Performed by: FAMILY MEDICINE

## 2023-11-07 PROCEDURE — 1159F MED LIST DOCD IN RCRD: CPT | Performed by: FAMILY MEDICINE

## 2023-11-07 PROCEDURE — 83036 HEMOGLOBIN GLYCOSYLATED A1C: CPT | Performed by: FAMILY MEDICINE

## 2023-11-07 PROCEDURE — 3008F BODY MASS INDEX DOCD: CPT | Performed by: FAMILY MEDICINE

## 2023-11-07 PROCEDURE — 81003 URINALYSIS AUTO W/O SCOPE: CPT | Performed by: FAMILY MEDICINE

## 2023-11-07 PROCEDURE — 87086 URINE CULTURE/COLONY COUNT: CPT | Performed by: FAMILY MEDICINE

## 2023-11-07 PROCEDURE — 3077F SYST BP >= 140 MM HG: CPT | Performed by: FAMILY MEDICINE

## 2023-11-07 PROCEDURE — 82570 ASSAY OF URINE CREATININE: CPT | Performed by: FAMILY MEDICINE

## 2023-11-07 PROCEDURE — 99215 OFFICE O/P EST HI 40 MIN: CPT | Performed by: FAMILY MEDICINE

## 2023-11-07 PROCEDURE — 82043 UR ALBUMIN QUANTITATIVE: CPT | Performed by: FAMILY MEDICINE

## 2023-12-07 ENCOUNTER — OFFICE VISIT (OUTPATIENT)
Dept: OBGYN CLINIC | Facility: CLINIC | Age: 78
End: 2023-12-07
Payer: MEDICARE

## 2023-12-07 VITALS
DIASTOLIC BLOOD PRESSURE: 80 MMHG | BODY MASS INDEX: 26.14 KG/M2 | HEIGHT: 64 IN | WEIGHT: 153.13 LBS | SYSTOLIC BLOOD PRESSURE: 126 MMHG | HEART RATE: 81 BPM

## 2023-12-07 DIAGNOSIS — D21.9 FIBROIDS: ICD-10-CM

## 2023-12-07 DIAGNOSIS — N95.0 PMB (POSTMENOPAUSAL BLEEDING): Primary | ICD-10-CM

## 2023-12-07 PROBLEM — I69.320 APHASIA AS LATE EFFECT OF STROKE: Chronic | Status: ACTIVE | Noted: 2023-10-12

## 2023-12-07 PROBLEM — I63.9 CEREBROVASCULAR ACCIDENT (CVA) DUE TO THROMBOSIS (HCC): Status: ACTIVE | Noted: 2020-03-27

## 2023-12-07 PROCEDURE — 3074F SYST BP LT 130 MM HG: CPT | Performed by: NURSE PRACTITIONER

## 2023-12-07 PROCEDURE — 99202 OFFICE O/P NEW SF 15 MIN: CPT | Performed by: NURSE PRACTITIONER

## 2023-12-07 PROCEDURE — 1160F RVW MEDS BY RX/DR IN RCRD: CPT | Performed by: NURSE PRACTITIONER

## 2023-12-07 PROCEDURE — 1159F MED LIST DOCD IN RCRD: CPT | Performed by: NURSE PRACTITIONER

## 2023-12-07 PROCEDURE — 3008F BODY MASS INDEX DOCD: CPT | Performed by: NURSE PRACTITIONER

## 2023-12-07 PROCEDURE — 3079F DIAST BP 80-89 MM HG: CPT | Performed by: NURSE PRACTITIONER

## 2023-12-07 NOTE — PROGRESS NOTES
Subjective:  66year old    Chief Complaint   Patient presents with    Consult     New patient, fibroids found on u/s     Pt here today, with her , and daughter on the phone  Pt has been experiencing vaginal bleeding over the past month  Pt saw PCP  and per note, pt unsure if she was experiencing hematuria or vaginal bleeding  Per daughter, pt was treated for UTI and feels that the bleeding is vaginal  Pelvic US completed 11/10/2023 showed 2 fibroids and endometrial stripe of 3 mm  Other than vaginal bleeding, pt is also noting lower abdominal/pelvic pressure over the last couple of weeks    Review of Systems:  Pertinent items are noted in the HPI. Objective:  /80   Pulse 81   Ht 64\"   Wt 153 lb 2 oz (69.5 kg)     Physical Examination:  General appearance: Well dressed, well nourished in no apparent distress  Neurologic/Psychiatric: Alert, mood normal, affect appropriate    Assessment/Plan:    Diagnoses and all orders for this visit:    PMB (postmenopausal bleeding)  - Discussed PMB and reviewed pelvic US results  - recommend EMB   - discussed in office EMB, will defer pelvic exam to procedure visit d/t mobility  - questions answered    Fibroids  - reviewed pelvic ultrasound  - discussed fibroids and possible side effects of fibroids  - to follow up with questions or concerns    Return for EMB.

## 2024-01-08 RX ORDER — ATORVASTATIN CALCIUM 80 MG/1
80 TABLET, FILM COATED ORAL NIGHTLY
Qty: 90 TABLET | Refills: 0 | Status: SHIPPED | OUTPATIENT
Start: 2024-01-08

## 2024-01-10 ENCOUNTER — OFFICE VISIT (OUTPATIENT)
Dept: OBGYN CLINIC | Facility: CLINIC | Age: 79
End: 2024-01-10
Payer: COMMERCIAL

## 2024-01-10 VITALS
WEIGHT: 153.5 LBS | BODY MASS INDEX: 26 KG/M2 | HEART RATE: 98 BPM | DIASTOLIC BLOOD PRESSURE: 78 MMHG | SYSTOLIC BLOOD PRESSURE: 122 MMHG

## 2024-01-10 DIAGNOSIS — Z12.4 SCREENING FOR CERVICAL CANCER: ICD-10-CM

## 2024-01-10 DIAGNOSIS — D25.1 INTRAMURAL LEIOMYOMA OF UTERUS: ICD-10-CM

## 2024-01-10 DIAGNOSIS — N95.0 POST-MENOPAUSAL BLEEDING: Primary | ICD-10-CM

## 2024-01-10 DIAGNOSIS — N81.2 CYSTOCELE WITH SECOND DEGREE UTERINE PROLAPSE: ICD-10-CM

## 2024-01-10 PROCEDURE — 87624 HPV HI-RISK TYP POOLED RSLT: CPT | Performed by: OBSTETRICS & GYNECOLOGY

## 2024-01-10 PROCEDURE — 88175 CYTOPATH C/V AUTO FLUID REDO: CPT | Performed by: OBSTETRICS & GYNECOLOGY

## 2024-01-10 PROCEDURE — 99214 OFFICE O/P EST MOD 30 MIN: CPT | Performed by: OBSTETRICS & GYNECOLOGY

## 2024-01-10 NOTE — PROGRESS NOTES
[facility]  Obstetrics and Gynecology  Consultation History & Physical    Sedasa Serafin Adams Patient Status:  No patient class for patient encounter    1945 MRN [id number]   Location [facility] - OB/GYN Attending No att. providers found   Hospital Day 0 PCP Keyla Lowe DO     Subjective:  Chief Complaint   Patient presents with    Other     EMB   Somewhat poor historian with difficulty communicating due to hx of stroke.  Some hx obtained from  who was present for history and physical    78 year old female  presents for consultation requested by Dr. Keyla Lowe due to possible postmenopausal bleeding versus hematuria.  Normal urine culture and U/A.  4 days of spotting 2-3 months ago.  Nothing since then.  Had an ultrasound which showed two small fibroids and an endometrial thickness of 3 mm.  No pain.     No LMP recorded. Patient is postmenopausal.       Last Pap smear:      Abnormal Pap: n  Last Mammo: 2019       Most Recent Immunizations   Administered Date(s) Administered    Covid-19 Vaccine Moderna 100 mcg/0.5 ml 2021    FLU VAC High Dose 65 YRS & Older PRSV Free (01841) 2022    FLUAD High Dose 65 yr and older (48163) 10/29/2018    Fluzone Vaccine Medicare () 10/09/2020    HIGH DOSE FLU 65 YRS AND OLDER PRSV FREE SINGLE D (17158) FLU CLINIC 2016    Influenza 10/27/1994    Pneumococcal (Prevnar 13) 2015    Pneumovax 23 2022    TDAP 2023    Tb Intradermal Test 10/27/1997    Zoster Vaccine Live (Zostavax) 2014      reports that she has never smoked. She has never used smokeless tobacco.   reports no history of alcohol use.    Past Medical History:   Diagnosis Date    Atrial septal aneurysm     DJD (degenerative joint disease)     knee    High cholesterol     Hyperlipidemia    Hyperlipidemia 2014    Iliotibial band syndrome of left side 3/15/2016    Insomnia     Left-sided  low back pain without sciatica 3/15/2016    Multinodular goiter 2011    multinodular goiter/thyroid cyst    Obese 2006    Obesity     Osteoarthritis     L knee    Stroke (cerebrum) (HCC) 2020    Tendinitis     Thyroid nodule      Past Surgical History:   Procedure Laterality Date    KNEE REPLACEMENT SURGERY  2010    L knee unicompartment replacement, unicondylar knee replacement          OTHER  5/22/15    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    OTHER SURGICAL HISTORY  2010    FNA, thyroid nodule       Review of Systems:  Pertinent items are noted in the HPI.    Objective:  /78   Pulse 98   Wt 153 lb 8 oz (69.6 kg)   BMI 26.35 kg/m²    Physical Examination:  General appearance: Well dressed, well nourished in no apparent distress  Neurologic/Psychiatric: Alert and oriented to person, place and time, mood normal, affect appropriate  Abdomen: Soft, non-tender, non-distended, no masses, no hepatosplenomegaly, no hernias, no inguinal lymphadenopathy  Pelvic:    External genitalia- Normal, Bartholin's, urethra, skeins glands normal   Vagina- No vaginal lesions, no discharge   Cervix- No lesions, long/closed, no cervical motion tenderness   Uterus- Normal sized, anteverted, non-tender, no masses   Adnexa-  Non-tender, no masses  Pap smear obtained   Grade 2-3 uterine prolapse with cystocele  Rectum: No hemorrhoids, no masses    Diagnostics:  Ultrasound 11/10/23  Exam: US PELVIC COM AND TRANSVAGINAL NON OB   CPT Code(s): 42996,62798 - TRANSVAGINAL US NON_OB,US EXAM PELVIC COMPLETE   CLINICAL HISTORY: Hematuria versus postmenopausal bleeding.   COMPARISONS: None   TECHNIQUE: Transabdominal and transvaginal pelvic ultrasound was performed using gray scale and color Doppler.   FINDINGS:   Transabdominal examination is limited due to the absence of a distended bladder   The uterus measures 6.1 x 4.7 x 4.8 cm. The uterine fundus is retroflexed. Endometrial stripe measures 3 mm.  There are 2 uterine fibroids. One measures 4.5 x 3.9 x 4.9 cm in the uterine fundus. The other is 2.9 x 2.1 x 2.0 cm and is in the anterior   uterine body along the right side.   The right ovary measures 1.5 x 1.6 x 1.1 cm and the left ovary measures 1.9 x 1.2 x 1.1 cm. There are no suspicious adnexal masses.   IMPRESSION:  2 myometrial fibroids measuring up to 4.9 and 2.9 cm.   Ovaries are normal.     Assessment/Plan  78 year old female  presents today for consultation due to postmenopausal bleeding and uterine fibroids.    [1] Postmenopausal bleeding- as the endometrial lining is less than 4 mm and no further bleeding since October, do not recommend endometrial biopsy at this time.  If recurrent bleeding, return to office for biopsy.  Pap smear done  [2] Uterine prolapse with cytocele- offered trial of pessary if desired.  If the cervix is rubbing on clothing during the day when patient is upright, it is possible that that could be the cause of her bleeding, although no evidence of cervical irritation/friability on exam today.  [3] Uterine fibroids- likely just an incidental finding.    Phone call made to daughter to update her on above as well as  who was present for exam today    Diagnoses and all orders for this visit:    Post-menopausal bleeding    Cystocele with second degree uterine prolapse    Intramural leiomyoma of uterus    Screening for cervical cancer  -     ThinPrep PAP Smear; Future  -     Hpv Dna  High Risk , Thin Prep Collect; Future       Cc to Dr. Keyla Guerrier MD  [facility]- Obstetrics & Gynecology  Office [phone]

## 2024-01-11 LAB — HPV I/H RISK 1 DNA SPEC QL NAA+PROBE: NEGATIVE

## 2024-01-15 LAB
.: NORMAL
.: NORMAL

## 2024-01-17 ENCOUNTER — MED REC SCAN ONLY (OUTPATIENT)
Dept: OBGYN CLINIC | Facility: CLINIC | Age: 79
End: 2024-01-17

## 2024-04-11 RX ORDER — ATORVASTATIN CALCIUM 80 MG/1
80 TABLET, FILM COATED ORAL NIGHTLY
Qty: 90 TABLET | Refills: 0 | Status: SHIPPED | OUTPATIENT
Start: 2024-04-11 | End: 2024-04-15

## 2024-04-15 RX ORDER — ATORVASTATIN CALCIUM 80 MG/1
80 TABLET, FILM COATED ORAL NIGHTLY
Qty: 90 TABLET | Refills: 0 | Status: SHIPPED | OUTPATIENT
Start: 2024-04-15

## 2024-08-19 ENCOUNTER — TELEPHONE (OUTPATIENT)
Dept: OBGYN CLINIC | Facility: CLINIC | Age: 79
End: 2024-08-19

## 2024-08-19 NOTE — TELEPHONE ENCOUNTER
Called daughter Andressa back for follow up.     Patient up to date with [employer]. Previously discussed possible uterine prolapse with abnormal uterine bleeding. Currently feels that it has worsened with pelvic discomfort present.     Appointment scheduled with provider for tomorrow at 1130 for follow up.   All questions answered.

## 2024-08-19 NOTE — TELEPHONE ENCOUNTER
Pt's daughter called and would like to know if a Dr could possibly see her mom today or tomorrow, she is having issues and is uncomfortable. Please Call Daughter Andressa back.

## 2024-08-20 ENCOUNTER — OFFICE VISIT (OUTPATIENT)
Dept: OBGYN CLINIC | Facility: CLINIC | Age: 79
End: 2024-08-20
Payer: MEDICARE

## 2024-08-20 VITALS
SYSTOLIC BLOOD PRESSURE: 132 MMHG | HEART RATE: 95 BPM | DIASTOLIC BLOOD PRESSURE: 80 MMHG | BODY MASS INDEX: 26 KG/M2 | WEIGHT: 153.81 LBS

## 2024-08-20 DIAGNOSIS — N81.2 CYSTOCELE WITH SECOND DEGREE UTERINE PROLAPSE: ICD-10-CM

## 2024-08-20 DIAGNOSIS — D25.9 UTERINE LEIOMYOMA, UNSPECIFIED LOCATION: ICD-10-CM

## 2024-08-20 DIAGNOSIS — R10.2 PELVIC PRESSURE IN FEMALE: Primary | ICD-10-CM

## 2024-08-20 LAB
BILIRUBIN: NEGATIVE
GLUCOSE (URINE DIPSTICK): NEGATIVE MG/DL
KETONES (URINE DIPSTICK): NEGATIVE MG/DL
MULTISTIX LOT#: ABNORMAL NUMERIC
NITRITE, URINE: POSITIVE
PH, URINE: 5.5 (ref 4.5–8)
PROTEIN (URINE DIPSTICK): 100 MG/DL
SPECIFIC GRAVITY: 1.02 (ref 1–1.03)
URINE-COLOR: YELLOW
UROBILINOGEN,SEMI-QN: 0.2 MG/DL (ref 0–1.9)

## 2024-08-20 PROCEDURE — 87186 SC STD MICRODIL/AGAR DIL: CPT | Performed by: NURSE PRACTITIONER

## 2024-08-20 PROCEDURE — 87088 URINE BACTERIA CULTURE: CPT | Performed by: NURSE PRACTITIONER

## 2024-08-20 PROCEDURE — 87086 URINE CULTURE/COLONY COUNT: CPT | Performed by: NURSE PRACTITIONER

## 2024-08-20 PROCEDURE — 81514 NFCT DS BV&VAGINITIS DNA ALG: CPT | Performed by: NURSE PRACTITIONER

## 2024-08-20 PROCEDURE — 81002 URINALYSIS NONAUTO W/O SCOPE: CPT | Performed by: NURSE PRACTITIONER

## 2024-08-20 PROCEDURE — 99214 OFFICE O/P EST MOD 30 MIN: CPT | Performed by: NURSE PRACTITIONER

## 2024-08-20 NOTE — PROGRESS NOTES
Subjective:  79 year old    Chief Complaint   Patient presents with    Other     Pelvic discomfort      Pt here today, with her daughter, with complaints of pelvic pressure for the last couple months  She was last seen 2024 by Dr. Guerrier who noted a cystocele  Also pelvic US results showed uterine fibroids  No unusual vaginal discharge or odor  Not sexually active    Review of Systems:  Pertinent items are noted in the HPI.    Objective:  /80   Pulse 95   Wt 153 lb 12.8 oz (69.8 kg)       Physical Examination:  General appearance: Well dressed, well nourished in no apparent distress  Neurologic/Psychiatric: Alert and oriented to person, place and time, mood normal, affect appropriate  Abdomen: Soft, non-tender, non-distended, no masses, no hepatosplenomegaly, no hernias, no inguinal lymphadenopathy  Pelvic:    External genitalia- Normal, Bartholin's, urethra, skeins glands normal   Vagina- No vaginal lesions, physiologic discharge, Grade 2-3 uterine prolapse with cystocele    Cervix- No lesions, long/closed, no cervical motion tenderness   Uterus- Normal, non-tender, no masses   Adnexa-  Non-tender, no masses    Assessment/Plan:      Diagnoses and all orders for this visit:    Pelvic pressure in female  -     Vaginitis Vaginosis PCR Panel; Future  -     URINALYSIS NONAUTO W/O SCOPE  -     Urine Culture, Routine; Future  - will treat based on culture results  - we also discussed that the cytocele can be causing pressure  - to follow up with new/worsening symptoms or no improvement    Cystocele with second degree uterine prolapse  -     Urogynecology Referral - In Network  - we dicussed cystocele and potential complications  - we discussed treatment with pessary vs urogyn -- ultimately pt has moved and lives closer to [place], while she thinks she would like to proceed with a pessary, she will follow up with urogyn 2/2 distance    Uterine leiomyoma, unspecified location  - we discussed uterine  fibroid  - we discussed that while it is less likely that fibroids can also cause pelvic pressure      Return if symptoms worsen or fail to improve.

## 2024-08-21 LAB
BV BACTERIA DNA VAG QL NAA+PROBE: NEGATIVE
C GLABRATA DNA VAG QL NAA+PROBE: NEGATIVE
C KRUSEI DNA VAG QL NAA+PROBE: NEGATIVE
CANDIDA DNA VAG QL NAA+PROBE: NEGATIVE
T VAGINALIS DNA VAG QL NAA+PROBE: NEGATIVE

## 2024-08-21 RX ORDER — NITROFURANTOIN 25; 75 MG/1; MG/1
100 CAPSULE ORAL 2 TIMES DAILY
Qty: 10 CAPSULE | Refills: 0 | Status: SHIPPED | OUTPATIENT
Start: 2024-08-21

## 2024-09-07 ENCOUNTER — HOSPITAL ENCOUNTER (OUTPATIENT)
Age: 79
Discharge: HOME | End: 2024-09-07
Payer: MEDICARE

## 2024-09-07 VITALS
RESPIRATION RATE: 16 BRPM | TEMPERATURE: 98 F | OXYGEN SATURATION: 100 % | HEART RATE: 95 BPM | SYSTOLIC BLOOD PRESSURE: 147 MMHG | DIASTOLIC BLOOD PRESSURE: 62 MMHG

## 2024-09-07 DIAGNOSIS — T23.271A PARTIAL THICKNESS BURN OF RIGHT WRIST, INITIAL ENCOUNTER: Primary | ICD-10-CM

## 2024-09-07 RX ORDER — ASPIRIN 325 MG
325 TABLET ORAL DAILY
COMMUNITY

## 2024-09-07 RX ORDER — CEPHALEXIN 500 MG/1
500 CAPSULE ORAL 4 TIMES DAILY
Qty: 40 CAPSULE | Refills: 0 | Status: SHIPPED | OUTPATIENT
Start: 2024-09-07 | End: 2024-09-17
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
## 2024-09-20 ENCOUNTER — TELEPHONE (OUTPATIENT)
Dept: UROLOGY | Facility: HOSPITAL | Age: 79
End: 2024-09-20

## 2024-09-20 ENCOUNTER — OFFICE VISIT (OUTPATIENT)
Dept: UROLOGY | Facility: HOSPITAL | Age: 79
End: 2024-09-20
Attending: OBSTETRICS & GYNECOLOGY
Payer: MEDICARE

## 2024-09-20 VITALS — WEIGHT: 153 LBS | HEIGHT: 64 IN | BODY MASS INDEX: 26.12 KG/M2 | RESPIRATION RATE: 16 BRPM

## 2024-09-20 DIAGNOSIS — R82.90 CLOUDY URINE: ICD-10-CM

## 2024-09-20 DIAGNOSIS — N39.41 URGE INCONTINENCE: Primary | ICD-10-CM

## 2024-09-20 DIAGNOSIS — R35.1 NOCTURIA: ICD-10-CM

## 2024-09-20 DIAGNOSIS — N81.2 UTEROVAGINAL PROLAPSE, INCOMPLETE: ICD-10-CM

## 2024-09-20 DIAGNOSIS — R39.14 FEELING OF INCOMPLETE BLADDER EMPTYING: ICD-10-CM

## 2024-09-20 DIAGNOSIS — N39.3 FEMALE STRESS INCONTINENCE: ICD-10-CM

## 2024-09-20 DIAGNOSIS — N81.84 PELVIC MUSCLE WASTING: ICD-10-CM

## 2024-09-20 DIAGNOSIS — R31.0 GROSS HEMATURIA: ICD-10-CM

## 2024-09-20 DIAGNOSIS — N95.2 POSTMENOPAUSAL ATROPHIC VAGINITIS: ICD-10-CM

## 2024-09-20 LAB
BILIRUB UR QL: NEGATIVE
COLOR UR: YELLOW
CONTROL RUN WITHIN 24 HOURS?: YES
GLUCOSE UR-MCNC: NORMAL MG/DL
KETONES UR-MCNC: NEGATIVE MG/DL
LEUKOCYTE ESTERASE UR QL STRIP.AUTO: 500
NITRITE UR QL STRIP.AUTO: NEGATIVE
NITRITE URINE: POSITIVE
PH UR: 5.5 [PH] (ref 5–8)
PROT UR-MCNC: 50 MG/DL
RBC #/AREA URNS AUTO: >10 /HPF
SP GR UR STRIP: 1.02 (ref 1–1.03)
UROBILINOGEN UR STRIP-ACNC: NORMAL
WBC #/AREA URNS AUTO: >50 /HPF
WBC CLUMPS UR QL AUTO: PRESENT /HPF

## 2024-09-20 PROCEDURE — 99212 OFFICE O/P EST SF 10 MIN: CPT

## 2024-09-20 PROCEDURE — 81002 URINALYSIS NONAUTO W/O SCOPE: CPT | Performed by: OBSTETRICS & GYNECOLOGY

## 2024-09-20 PROCEDURE — 51701 INSERT BLADDER CATHETER: CPT

## 2024-09-20 PROCEDURE — 87086 URINE CULTURE/COLONY COUNT: CPT | Performed by: OBSTETRICS & GYNECOLOGY

## 2024-09-20 PROCEDURE — 81001 URINALYSIS AUTO W/SCOPE: CPT | Performed by: OBSTETRICS & GYNECOLOGY

## 2024-09-20 PROCEDURE — 87186 SC STD MICRODIL/AGAR DIL: CPT | Performed by: OBSTETRICS & GYNECOLOGY

## 2024-09-20 RX ORDER — NITROFURANTOIN 25; 75 MG/1; MG/1
100 CAPSULE ORAL 2 TIMES DAILY
Qty: 20 CAPSULE | Refills: 0 | Status: SHIPPED | OUTPATIENT
Start: 2024-09-20 | End: 2024-09-30

## 2024-09-20 RX ORDER — NITROFURANTOIN MACROCRYSTAL 100 MG
100 CAPSULE ORAL 2 TIMES DAILY
Qty: 14 CAPSULE | Refills: 0 | Status: SHIPPED | OUTPATIENT
Start: 2024-09-20 | End: 2024-09-20

## 2024-09-20 RX ORDER — ESTRADIOL 0.1 MG/G
CREAM VAGINAL
Qty: 42 G | Refills: 3 | Status: SHIPPED | OUTPATIENT
Start: 2024-09-20

## 2024-09-20 RX ORDER — NITROFURANTOIN MACROCRYSTAL 100 MG
100 CAPSULE ORAL 2 TIMES DAILY
Qty: 6 CAPSULE | Refills: 0 | Status: SHIPPED | OUTPATIENT
Start: 2024-09-20 | End: 2024-09-20

## 2024-09-20 NOTE — PROGRESS NOTES
My Montano,   2024     Referred by Dr. Guerrier/GRECIA Vergara  Pt here with daughter, Andressa    Chief Complaint   Patient presents with    Prolapse    Urinary Symptoms     VALENTIN,UUI, nocturia     Pelvic ultrasound   The uterus measures 6.1 x 4.7 x 4.8 cm. The uterine fundus is retroflexed. Endometrial stripe measures 3 mm. There are 2 uterine fibroids. One measures 4.5 x 3.9 x 4.9 cm in the uterine fundus. The other is 2.9 x 2.1 x 2.0 cm and is in the anterior uterine body along the right side.     HPI:  +VALENTIN  +UUI  Nocturia x3+  Sense of incomplete bladder emptying  + prolapse sx  Not sexually active, n/a dyspareunia  Irreg bowels, every other day & hard    PRIOR TREATMENTS:    Kegels    No reg UTIs  H/o PMB, pelvic us as above  Reports +gross hematuria, blood noted in toilet    , vdx2  Interested in pessary mgmt    Vitals:  Resp 16   Ht 64\"   Wt 153 lb (69.4 kg)   BMI 26.26 kg/m²      HISTORY:  Past Medical History:    Atrial septal aneurysm    DJD (degenerative joint disease)    knee    High cholesterol    Hyperlipidemia    Hyperlipidemia    Iliotibial band syndrome of left side    Insomnia    Left-sided low back pain without sciatica    Multinodular goiter    multinodular goiter/thyroid cyst    Obese    Obesity    Osteoarthritis    L knee    Stroke (cerebrum) (HCC)    Tendinitis    Thyroid nodule      Past Surgical History:   Procedure Laterality Date    Knee replacement surgery  2010    L knee unicompartment replacement, unicondylar knee replacement          Other  5/22/15    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    Other surgical history  2010    FNA, thyroid nodule      Family History   Problem Relation Age of Onset    High Cholesterol Father     Cancer Father         gastric ca, father's side    Other (CAD) Father     Stroke Mother     Heart Disorder Mother     Obesity Mother     Cancer Other         ovarian ca    Other (Leukemia) Other         aunt    Breast  Cancer Other         aunt    Heart Disorder Maternal Grandmother     Obesity Brother       Social History     Socioeconomic History    Marital status:    Tobacco Use    Smoking status: Never    Smokeless tobacco: Never   Vaping Use    Vaping status: Never Used   Substance and Sexual Activity    Alcohol use: No     Alcohol/week: 0.0 standard drinks of alcohol    Drug use: No    Sexual activity: Not Currently     Partners: Male   Other Topics Concern    Caffeine Concern Yes     Comment: coffee, tea 2 cups/day    Exercise Yes    Seat Belt Yes        Allergies:  Allergies   Allergen Reactions    Bee Venom      \"bee sting\"       Medications:  Outpatient Medications Prior to Visit   Medication Sig Dispense Refill    aspirin 325 MG Oral Tab Take 1 tablet (325 mg total) by mouth daily.      atorvastatin 80 MG Oral Tab Take 1 tablet (80 mg total) by mouth nightly. 90 tablet 0    nitrofurantoin monohydrate macro (MACROBID) 100 MG Oral Cap Take 1 capsule (100 mg total) by mouth 2 (two) times daily. For 5 days (Patient not taking: Reported on 9/7/2024) 10 capsule 0    citalopram 10 MG Oral Tab Take 1 tablet (10 mg total) by mouth at bedtime. (Patient not taking: Reported on 9/7/2024) 30 tablet 0    Cholecalciferol (VITAMIN D3) 25 MCG (1000 UT) Oral Cap Take 1 tablet by mouth daily. (Patient not taking: Reported on 9/7/2024)       No facility-administered medications prior to visit.       Urogynecology Summary:  Urogynecology Summary  Prolapse: Yes  VALENTIN: Yes  Urge Incontinence: Yes  Nocturia Frequency: 3  Frequency: 1 - 2 hours  Incomplete emptying: Yes  Constipation: Yes  Wears pad day?: 2  Wears Pad Night?: 3  Activities are limited by UI/POP?: No  Currently Sexually Active: No    Review of Systems:    A comprehensive 12 point review of systems was completed.  Pertinent positives noted in the the HPI.  No CP  No SOB    GENERAL EXAM:  GENERAL:  Alert and Oriented, and NAD  HEENT:  Normal, no lesions  LUNGS:  Normal  effort  HEART:  RRR  ABDOMEN: soft, no mass, no hernia  EXTREM:  Normal, no edema  SKIN:  Normal, no lesions    PELVIC EXAM:  Ext. Gen: +atrophy, no lesions  Urethra: +atrophy, nontender, +caruncle  Bladder:+fullness, nontender  Vagina: +atrophy  Cervix: no bleeding, no lesions, nontender  Uterus: +mobile  Adnexa:no masses, nontender  Perineum: nontender  Anus: wnl  Rectum: defer    PELVIS FLOOR NEUROMUSCULAR FUNCTION:  Strength:  1 and Unable to hold greater than 3 sec  Perineal Sensation:  Normal      PELVIC SUPPORT:  [place]:  3  Ant:  3  Post:  2  CST:  negative  UVJ: somewhat hypermobile    Pt examined with chaperone, RN  Verbal consent obtained  Sterile technique used to empty bladder, specimen sent (cloudy)      Impression/Plan:    ICD-10-CM    1. Urge incontinence  N39.41 POCT urinalysis dipstick[60465]     Urinalysis, Routine     Urine Culture, Routine     Straight/Self Cath      2. Postmenopausal atrophic vaginitis  N95.2 estradiol (ESTRACE) 0.1 MG/GM Vaginal Cream      3. Nocturia  R35.1       4. Pelvic muscle wasting  N81.84       5. Uterovaginal prolapse, incomplete  N81.2       6. Female stress incontinence  N39.3 Urinalysis, Routine      7. Gross hematuria  R31.0 US KIDNEYS (CPT=76775)      8. Feeling of incomplete bladder emptying  R39.14       9. Cloudy urine  R82.90 nitrofurantoin monohydrate macro (MACROBID) 100 MG Oral Cap          Discussion Items:   Urodynamics and cystoscopy for evaluation of LUTS  Behavioral and pharmacologic treatments for OAB  Nonsurgical and surgical treatments for Stress Urinary Incontinence  Nonsurgical and surgical treatments for POP  Pelvic muscle rehabilitation including pelvic floor PT  Topical estrogen therapy for treating UGA  Bowel routine/constipation regimen  Discussed dietary and behavioral modification, discussed pharmacologic and nonpharmacologic mgmt options for urinary symptoms.     Diagnostic Items:  Cystoscopy  Renal Ultrasound  Urine testing, empiric NTF  100mg PO BID x10d    Medications Discussed:  Estrace Cream    Treatment Plan, Non-surgical:   RN teaching/pt education done  Fiber supplement  Stimulant:  MOM, Miralax  Estrace / Premarin cream    Treatment Plan, Surgical:   None    Discussed mgmt of vulvovaginal atrophy with vaginal estrogen cream. Reviewed associated benefits, risks, alternatives, and goals. Recommend low dose twice weekly mgmt   Initiate   Discussed management of pelvic organ prolapse including but not limited to behavioral modifications, conservative options, and surgical management.   Discussed pessary management including benefits and risks. Discussed importance of keeping regularly scheduled pessary checks in prevention of complications related to pessary use.     Bowel management reviewed. Discussed using fiber daily w/ addition of miralax as needed    Pt verbalizes understanding of all above discussed information. All questions answered. She agrees to plan    Return for cysto & pessary trial.    My Montano DO, FACOG, FACS      Discussion undertaken in English, info provided      The 21st Century Cures Act makes medical notes like these available to patients in the interest of transparency. However, be advised this is a medical document. It is intended as peer to peer communication. It is written in medical language and may contain abbreviations or verbiage that are unfamiliar. It may appear blunt or direct. Medical documents are intended to carry relevant information, facts as evident, and the clinical opinion of the practitioner.

## 2024-09-20 NOTE — TELEPHONE ENCOUNTER
TC to pt's daughter, Andressa instructing her that NTF prescription should be for 10 days instead of previously prescribed 7 days.  New prescription for remainder of antibx course sent to pt's desired pharmacy.  Spoke with pharmacy staff to verify.  Andressa verbalizes understanding and agrees with plan.

## 2024-09-23 ENCOUNTER — TELEPHONE (OUTPATIENT)
Dept: UROLOGY | Facility: HOSPITAL | Age: 79
End: 2024-09-23

## 2024-09-23 NOTE — TELEPHONE ENCOUNTER
Telephone call to patient and detailed message left with test results.    Ucx + for >100k ecoli ESBL    Allergies reviewed    Continue macrobid 100 mg po BID x 10 days.    Encouraged PO hydration, AZO prn for pain    Encouraged to avoid bladder irritants such as caffeine, alcohol, or carbonation    Follow up as scheduled on 10/3/24, sooner prn

## 2024-10-03 ENCOUNTER — OFFICE VISIT (OUTPATIENT)
Dept: UROLOGY | Facility: HOSPITAL | Age: 79
End: 2024-10-03
Attending: PHYSICIAN ASSISTANT
Payer: MEDICARE

## 2024-10-03 VITALS — HEIGHT: 63 IN | WEIGHT: 153 LBS | BODY MASS INDEX: 27.11 KG/M2 | RESPIRATION RATE: 18 BRPM

## 2024-10-03 DIAGNOSIS — N81.84 PELVIC MUSCLE WASTING: ICD-10-CM

## 2024-10-03 DIAGNOSIS — N95.2 POSTMENOPAUSAL ATROPHIC VAGINITIS: ICD-10-CM

## 2024-10-03 DIAGNOSIS — N81.2 UTEROVAGINAL PROLAPSE, INCOMPLETE: Primary | ICD-10-CM

## 2024-10-03 DIAGNOSIS — N39.41 URGE INCONTINENCE: ICD-10-CM

## 2024-10-03 DIAGNOSIS — R39.14 FEELING OF INCOMPLETE BLADDER EMPTYING: ICD-10-CM

## 2024-10-03 PROCEDURE — 87086 URINE CULTURE/COLONY COUNT: CPT | Performed by: PHYSICIAN ASSISTANT

## 2024-10-03 PROCEDURE — 51701 INSERT BLADDER CATHETER: CPT | Performed by: PHYSICIAN ASSISTANT

## 2024-10-03 NOTE — PROGRESS NOTES
Pt here for pessary trial due to bulge  Here with daughter in law, Elvira    Wants to exhaust conservative options    Hasn't started vag estrogen  Bowels improving, using fiber  Denies current UTI s/sx  Recently finished abx for UTI, requesting repeat Cx    She is not currently interested in surgical management of her pelvic organ prolapse  Desires office mgmt    Urogynecology Summary:  Urogynecology Summary  Prolapse: Yes  VALENTIN: Yes  Urge Incontinence: Yes  Nocturia Frequency: 2  Frequency: 1 - 2 hours  Incomplete emptying: Yes  Constipation: No (Metamucil ?)  Wears pad day?: 4  Wears Pad Night?: 2  Activities are limited by UI/POP?: No    Vitals:  Vitals:    10/03/24 0949   Resp: 18       GENERAL EXAM:  GENERAL: alert & oriented, NAD  HEENT: NC/AT, sclera without injection  SKIN: no rashes  LUNGS:  without increased respiratory effort  ABDOMEN: soft, non-tender, non-distended, no masses appreciated  EXT: no edema    PELVIC EXAM: PRISCA Mcgee, present for exam as a chaperone  Ext. Gen: +atrophy, no lesions  Urethra: +atrophy, nontender, +caruncle  Bladder: no fullness, nontender  Vagina: +atrophy  Cervix: no bleeding, no lesions, nontender  Uterus: +mobile  Adnexa:no masses, nontender  Perineum: nontender  Anus: wnl  Rectum: defer     PELVIS FLOOR NEUROMUSCULAR FUNCTION:  Strength:  1 and Unable to hold greater than 3 sec  Perineal Sensation:  Normal        PELVIC SUPPORT:  [place]:  3  Ant:  3  Post:  2  CST:  negative  UVJ: somewhat hypermobile      A #3 LS gellhorn was placed without difficulty, knob descended with valsalva & standing, removed  A #4 LS gellhorn was placed without difficulty  Patient stated it was comfortable and supportive.    Pt voided 50 mL in the privacy of the bathroom  After obtaining verbal consent from the patient, sterile technique used to prep urethra and collect urine.  PVR 60 mL.  Sent for culture.    Impression:    ICD-10-CM    1. Uterovaginal prolapse, incomplete  N81.2       2.  Postmenopausal atrophic vaginitis  N95.2       3. Pelvic muscle wasting  N81.84       4. Feeling of incomplete bladder emptying  R39.14 Straight Cath PVR      5. Urge incontinence  N39.41 Urine Culture, Routine          Discussion Items:   Discussed mgmt of vulvovaginal atrophy with vaginal estrogen cream. Reviewed associated benefits, risks, alternatives, and goals. Recommend low dose 2x/week mgmt.  -- discussed estring if not consistently using cream    Discussed management of pelvic organ prolapse including but not limited to behavioral modifications, conservative options, and surgical management.   Discussed pessary management including benefits and risks. Discussed importance of keeping regularly scheduled pessary checks in prevention of complications related to pessary use.     Treatment Plan:  Urine collected via straight cath & sent for Cx, follow result, tx if + (declines empiric abx)  Continue pessary management with plan for office care  Start low dose vag estrogen as prescribed  Call with s/sx of UTI, problems with pessary   All questions answered  She understands and agrees to plan    Return in about 2 weeks (around 10/17/2024) for pessary care / cysto with Dr Montano.      Sera Mcdonald PA-C    Note to patient: The 21st Century Cures Act makes medical notes like these available to patients in the interest of transparency.  However, be advised this is a medical document.  It is intended as peer to peer communication.  It is written in medical language and may contain abbreviations or verbiage that are unfamiliar.  It may appear blunt or direct.  Medical documents are intended to carry relevant information, facts as evident, and the clinical opinion of the practitioner.

## 2024-10-10 ENCOUNTER — HOSPITAL ENCOUNTER (OUTPATIENT)
Dept: ULTRASOUND IMAGING | Facility: HOSPITAL | Age: 79
Discharge: HOME | End: 2024-10-10
Attending: OBSTETRICS & GYNECOLOGY
Payer: MEDICARE

## 2024-10-10 DIAGNOSIS — R31.0 GROSS HEMATURIA: ICD-10-CM

## 2024-10-10 PROCEDURE — 76775 US EXAM ABDO BACK WALL LIM: CPT | Performed by: OBSTETRICS & GYNECOLOGY

## 2024-10-11 ENCOUNTER — TELEPHONE (OUTPATIENT)
Dept: UROLOGY | Facility: HOSPITAL | Age: 79
End: 2024-10-11

## 2024-10-11 DIAGNOSIS — R93.429 ABNORMAL RENAL ULTRASOUND: Primary | ICD-10-CM

## 2024-10-11 NOTE — TELEPHONE ENCOUNTER
TC to pt's daughter Andressa with renal US results, showing multiple renal cysts.  AHSAN Jimenes PA-C, CT Urogram due to multiple cysts seen on US.  Cysto currently scheduled for 10/16/24.   Daughter will call back to reschedule cysto for after CTU completed so that all imaging is done before cysto.  Andressa understands and agrees to plan.

## 2024-10-23 ENCOUNTER — HOSPITAL ENCOUNTER (OUTPATIENT)
Dept: CT IMAGING | Facility: HOSPITAL | Age: 79
Discharge: HOME | End: 2024-10-23
Attending: PHYSICIAN ASSISTANT
Payer: MEDICARE

## 2024-10-23 DIAGNOSIS — R93.429 ABNORMAL RENAL ULTRASOUND: ICD-10-CM

## 2024-10-23 LAB
CREAT BLD-MCNC: 0.8 MG/DL
EGFRCR SERPLBLD CKD-EPI 2021: 75 ML/MIN/1.73M2 (ref 60–?)

## 2024-10-23 PROCEDURE — 82565 ASSAY OF CREATININE: CPT

## 2024-10-23 PROCEDURE — 76377 3D RENDER W/INTRP POSTPROCES: CPT | Performed by: PHYSICIAN ASSISTANT

## 2024-10-23 PROCEDURE — 74178 CT ABD&PLV WO CNTR FLWD CNTR: CPT | Performed by: PHYSICIAN ASSISTANT

## 2024-10-24 ENCOUNTER — TELEPHONE (OUTPATIENT)
Dept: UROLOGY | Facility: HOSPITAL | Age: 79
End: 2024-10-24

## 2024-10-24 NOTE — TELEPHONE ENCOUNTER
TC to patient with results of CT Urogram.  No answer.  Detailed voice mail left informing patient that there were small, non-obstructing stones noted in both kidneys and no hydronephrosis.  Reminded of appt on 10/30/24 for cystoscopy.  Encouraged pt to call us to verify that this message was received, and to discuss any questions or concerns.  Call back number provided.

## 2024-10-30 ENCOUNTER — OFFICE VISIT (OUTPATIENT)
Dept: UROLOGY | Facility: HOSPITAL | Age: 79
End: 2024-10-30
Attending: OBSTETRICS & GYNECOLOGY
Payer: MEDICARE

## 2024-10-30 VITALS — WEIGHT: 153 LBS | HEIGHT: 63 IN | RESPIRATION RATE: 18 BRPM | BODY MASS INDEX: 27.11 KG/M2

## 2024-10-30 DIAGNOSIS — N81.84 PELVIC MUSCLE WASTING: ICD-10-CM

## 2024-10-30 DIAGNOSIS — N81.2 UTEROVAGINAL PROLAPSE, INCOMPLETE: ICD-10-CM

## 2024-10-30 DIAGNOSIS — N95.2 POSTMENOPAUSAL ATROPHIC VAGINITIS: ICD-10-CM

## 2024-10-30 DIAGNOSIS — R39.14 FEELING OF INCOMPLETE BLADDER EMPTYING: ICD-10-CM

## 2024-10-30 DIAGNOSIS — R31.0 GROSS HEMATURIA: Primary | ICD-10-CM

## 2024-10-30 LAB
CONTROL RUN WITHIN 24 HOURS?: YES
NITRITE URINE: NEGATIVE

## 2024-10-30 PROCEDURE — 99212 OFFICE O/P EST SF 10 MIN: CPT

## 2024-10-30 PROCEDURE — 51701 INSERT BLADDER CATHETER: CPT | Performed by: OBSTETRICS & GYNECOLOGY

## 2024-10-30 PROCEDURE — 52000 CYSTOURETHROSCOPY: CPT

## 2024-10-30 PROCEDURE — 81002 URINALYSIS NONAUTO W/O SCOPE: CPT | Performed by: OBSTETRICS & GYNECOLOGY

## 2024-10-30 PROCEDURE — 88108 CYTOPATH CONCENTRATE TECH: CPT | Performed by: OBSTETRICS & GYNECOLOGY

## 2024-10-30 RX ORDER — LIDOCAINE HYDROCHLORIDE 20 MG/ML
10 JELLY TOPICAL ONCE
Status: COMPLETED | OUTPATIENT
Start: 2024-10-30 | End: 2024-10-30

## 2024-10-30 RX ADMIN — LIDOCAINE HYDROCHLORIDE 10 ML: 20 JELLY TOPICAL at 10:41:00

## 2024-10-30 NOTE — PROGRESS NOTES
Pt here for pessary check  Using #4 LS gellhorn pessary    Denies discharge or bleeding  Denies current UTI s/sx  Happy with pessary    Vital Signs:  Resp 18   Ht 63\"   Wt 153 lb (69.4 kg)   BMI 27.10 kg/m²     GENERAL EXAM:   GENERAL: alert & oriented, NAD  HEENT: NC/AT, sclera without injection  SKIN: no rashes  LUNGS:  without increased respiratory effort  ABDOMEN: soft, non-tender, non-distended, no masses appreciated  EXT: no edema     PELVIC EXAM: BRIAN Magallanes, present for exam as a chaperone  Ext. Gen: +atrophy, no lesions  Urethra: +atrophy, nontender, +caruncle  Bladder: some fullness, nontender  Vagina: +atrophy  Cervix: no bleeding, no lesions, nontender  Uterus: +mobile  Adnexa:no masses, nontender  Perineum: nontender  Anus: wnl  Rectum: defer     PELVIS FLOOR NEUROMUSCULAR FUNCTION:  Strength:  1 and Unable to hold greater than 3 sec  Perineal Sensation:  Normal        PELVIC SUPPORT:  [place]:  3  Ant:  3  Post:  2  CST:  negative  UVJ: somewhat hypermobile    After obtaining patient's verbal consent, sterile technique used to prep urethra and empty bladder.    Pt's pessary was removed, cleaned, and reinserted without difficulty.    Impression:  Encounter Diagnoses   Name Primary?         Uterovaginal prolapse, incomplete Yes    Postmenopausal atrophic vaginitis     Pelvic muscle wasting     Feeling of incomplete bladder emptying        Discussion Items:  Discussed mgmt of vulvovaginal atrophy with vaginal estrogen cream. Reviewed associated benefits, risks, alternatives, and goals. Recommend low dose 2x/week treatment.    Discussed management of pelvic organ prolapse including but not limited to behavioral modifications, conservative options including PT & pessary use, and surgical management.   Discussed pessary management including benefits and risks. Discussed importance of keeping regularly scheduled pessary checks in prevention of complications related to pessary use.     Plan:  Continue pessary  care, office care  Continue vag estrogen 2x weekly  Bowel regimen  Bladder diet/drill  Call with s/sx of UTI    F/u 4 weeks for pessary care.

## 2024-10-30 NOTE — PROGRESS NOTES
[facility] at  [facility]  [address]  [facility] [address]  PH: [phone]  FAX: [phone]     Date Patient MRN   10/30/2024 Seda Adams   [address]  [place] [address]  [id number]      Dr. My Montano,      Patient Name:  Seda Adams   Patient : 1945 Patient Age: 79 year old   Referring Physician:  No ref. provider found    Vitals   Resp 18   Ht 63\"   Wt 153 lb (69.4 kg)   BMI 27.10 kg/m²       Medications   Encounter Medications[1]      Allergies   Allergies were reviewed with positive findings listed below:  Bee venom      Chief Complaint   Chief Complaint   Patient presents with    Cystourethroscopy Procedure     Cystoscopy for gross hematuria        URINALYSIS:  Urine Color   Date Value Ref Range Status   2023 Light yellow Yellow, Pink, Light yellow, Blue, Other, Dk Yellow Final     Urine Clarity   Date Value Ref Range Status   2023 Cloudy (A) Clear, Hazy, [place] Final     Leukocyte esterase urine   Date Value Ref Range Status   10/30/2024 Trace Negative Final     Nitrite Urine   Date Value Ref Range Status   10/30/2024 Negative Negative Final     Blood, Urine   Date Value Ref Range Status   2023 Moderate (A) Negative Final     Blood Urine   Date Value Ref Range Status   10/30/2024 Small (A) Negative Final         Procedure        PRE-OP DIAGNOSIS: gross hematuria    POST-OP DIAGNOSIS: same    ANESTHESIA: 2% Lidocaine gel.    PROCEDURES:  After sterile prep with Betadine the urethral lumen was prepped via catheter injection of approx. 3 mL 2% Lidocaine gel.  The urethra was gently dilated to accommodate the cystoscope.  Infusion of sterile water via the cystoscope was performed to achieve a comfortable bladder distension to allow a full view.  A careful, systemic assessment of the complete lumen of the bladder and urethra was performed.    SPECIMENS:  urine cytology    DESCRIPTION Bladder Trigone UV  Junction URETHRA       Proximal Mild Distal   Negative/NL         Squamous Metaplasia         Polyps         Diverticulum         Other (exudate, cysts)           Bladder Trabeculation: mild    Gross hematuria  Cysto d/w pt  Cytology pending, will follow results  Upper tract imaging - CT urogram  KIDNEYS:  2-3 mm nonobstructing calculi in both kidneys.  No hydronephrosis or obstructing urinary calculus.  Bilateral parapelvic cysts.       Electronically signed by My Montano DO         [1]   Outpatient Encounter Medications as of 10/30/2024   Medication Sig Dispense Refill    estradiol (ESTRACE) 0.1 MG/GM Vaginal Cream Apply 1/2 gram vaginally 2 times per week. 42 g 3    aspirin 325 MG Oral Tab Take 1 tablet (325 mg total) by mouth daily.      atorvastatin 80 MG Oral Tab Take 1 tablet (80 mg total) by mouth nightly. 90 tablet 0    citalopram 10 MG Oral Tab Take 1 tablet (10 mg total) by mouth at bedtime. (Patient not taking: Reported on 10/30/2024) 30 tablet 0    Cholecalciferol (VITAMIN D3) 25 MCG (1000 UT) Oral Cap Take 1 tablet by mouth daily. (Patient not taking: Reported on 10/30/2024)       No facility-administered encounter medications on file as of 10/30/2024.

## 2024-10-31 ENCOUNTER — TELEPHONE (OUTPATIENT)
Dept: UROLOGY | Facility: HOSPITAL | Age: 79
End: 2024-10-31

## 2024-10-31 LAB — NON GYNE INTERPRETATION: NEGATIVE

## 2024-10-31 NOTE — TELEPHONE ENCOUNTER
TC to pt with test results.  Informed pt that urine cytology obtained on 10/30/24 was WNL. Negative for high-grade urothelial carcinoma .   Reminded of next appt on 12/3/24.  Pt verbalizes understanding   Encouraged to call the office with questions or concerns at [phone].  Agrees to plan.

## 2024-12-03 ENCOUNTER — OFFICE VISIT (OUTPATIENT)
Dept: UROLOGY | Facility: HOSPITAL | Age: 79
End: 2024-12-03
Attending: PHYSICIAN ASSISTANT
Payer: MEDICARE

## 2024-12-03 VITALS — RESPIRATION RATE: 16 BRPM | HEIGHT: 63 IN | WEIGHT: 153 LBS | BODY MASS INDEX: 27.11 KG/M2

## 2024-12-03 DIAGNOSIS — N95.2 POSTMENOPAUSAL ATROPHIC VAGINITIS: ICD-10-CM

## 2024-12-03 DIAGNOSIS — N81.2 UTEROVAGINAL PROLAPSE, INCOMPLETE: Primary | ICD-10-CM

## 2024-12-03 DIAGNOSIS — N81.84 PELVIC MUSCLE WASTING: ICD-10-CM

## 2024-12-03 PROCEDURE — 99212 OFFICE O/P EST SF 10 MIN: CPT

## 2024-12-03 NOTE — PROGRESS NOTES
Pt presents for f/u of pelvic organ prolapse, pessary care  Son &  in waiting room    Using #4 LS gellhorn pessary, office care    Reports pessary is comfortable   Denies discharge  Denies bleeding  Denies s/sx of UTI  Bowels reg  Pt is using vaginal estrogen cream 2x weekly    Happy with pessary, feels supported  She is not currently interested in surgical management of her pelvic organ prolapse    Urogynecology Summary:  Urogynecology Summary  Prolapse: No  VALENTIN: No  Urge Incontinence: No  Nocturia Frequency: 2  Frequency: 2 - 3 hours  Incomplete emptying: No  Constipation: No  Activities are limited by UI/POP?: No  Currently Sexually Active: No      Vitals:  Vitals:    12/03/24 1158   Resp: 16       GENERAL EXAM:  GENERAL: alert & oriented, NAD  HEENT: NC/AT, sclera without injection  SKIN: no rashes  LUNGS:  without increased respiratory effort  ABDOMEN: soft, non-tender, non-distended, no masses appreciated  EXT: no edema    PELVIC EXAM: BRIAN [place], present for exam as a chaperone  Ext. Gen: +atrophy, no lesions  Urethra: +atrophy, nontender, +caruncle  Bladder: some fullness, nontender  Vagina: +atrophy  Cervix: no bleeding, no lesions, nontender  Uterus: +mobile  Adnexa:no masses, nontender  Perineum: nontender  Anus: wnl  Rectum: defer     PELVIS FLOOR NEUROMUSCULAR FUNCTION:  Strength:  1 and Unable to hold greater than 3 sec  Perineal Sensation:  Normal        PELVIC SUPPORT:  [place]:  3  Ant:  3  Post:  2  CST:  negative  UVJ: somewhat hypermobile    Her pessary was removed, cleaned, and reinserted without difficulty.    Impression:    ICD-10-CM    1. Uterovaginal prolapse, incomplete  N81.2       2. Postmenopausal atrophic vaginitis  N95.2       3. Pelvic muscle wasting  N81.84           Discussion Items:   Discussed mgmt of vulvovaginal atrophy with vaginal estrogen cream. Reviewed associated benefits, risks, alternatives, and goals. Recommend low dose 2x weekly mgmt.     Discussed management of  pelvic organ prolapse including but not limited to behavioral modifications, conservative options, and surgical management.   Discussed pessary management including benefits and risks. Discussed importance of keeping regularly scheduled pessary checks in prevention of complications related to pessary use.     Treatment Plan:  Continue pessary management, office care  Continue vag estrogen as prescribed  Encouraged daily pelvic exercises  Cont bowel regimen  Call with s/sx of UTI, problems with pessary     All questions answered  Pt understands and agrees to plan       Return in about 6 weeks (around 1/14/2025) for pessary care.      Sera Mcdonald PA-C    Note to patient: The 21st Century Cures Act makes medical notes like these available to patients in the interest of transparency.  However, be advised this is a medical document.  It is intended as peer to peer communication.  It is written in medical language and may contain abbreviations or verbiage that are unfamiliar.  It may appear blunt or direct.  Medical documents are intended to carry relevant information, facts as evident, and the clinical opinion of the practitioner.

## 2025-01-14 ENCOUNTER — OFFICE VISIT (OUTPATIENT)
Dept: UROLOGY | Facility: HOSPITAL | Age: 80
End: 2025-01-14
Attending: PHYSICIAN ASSISTANT
Payer: MEDICARE

## 2025-01-14 VITALS — HEIGHT: 63 IN | RESPIRATION RATE: 18 BRPM | WEIGHT: 153 LBS | BODY MASS INDEX: 27.11 KG/M2

## 2025-01-14 DIAGNOSIS — N81.84 PELVIC MUSCLE WASTING: ICD-10-CM

## 2025-01-14 DIAGNOSIS — N81.2 UTEROVAGINAL PROLAPSE, INCOMPLETE: Primary | ICD-10-CM

## 2025-01-14 DIAGNOSIS — N95.2 POSTMENOPAUSAL ATROPHIC VAGINITIS: ICD-10-CM

## 2025-01-14 PROCEDURE — 99212 OFFICE O/P EST SF 10 MIN: CPT

## 2025-01-14 NOTE — PROGRESS NOTES
Pt presents for f/u of pelvic organ prolapse, pessary care  Using #4 LS gellhorn pessary, office care    Reports pessary is comfortable   Denies discharge  Denies bleeding  Denies s/sx of UTI  Bowels reg  Pt is using vaginal estrogen cream 2-3x weekly    Happy with pessary, feels supported  She is not currently interested in surgical management of her pelvic organ prolapse    Urogynecology Summary:  Urogynecology Summary  Prolapse: No  VALENTIN: No  Urge Incontinence: Yes  Nocturia Frequency: 2  Frequency: 2 - 3 hours (q3h)  Incomplete emptying: Yes (??)  Constipation: No  Wears pad day?: 3      Vitals:  Vitals:    01/14/25 1135   Resp: 18       GENERAL EXAM:  GENERAL: alert & oriented, NAD  HEENT: NC/AT, sclera without injection  SKIN: no rashes  LUNGS:  without increased respiratory effort  ABDOMEN: soft, non-tender, non-distended, no masses appreciated  EXT: no edema    PELVIC EXAM: PRISCA Mcgee, present for exam as a chaperone  Ext. Gen: +atrophy, no lesions  Urethra: +atrophy, nontender, +caruncle  Bladder: no fullness, nontender  Vagina: +atrophy  Cervix: no bleeding, no lesions, nontender  Uterus: +mobile  Adnexa:no masses, nontender  Perineum: nontender  Anus: wnl  Rectum: defer     PELVIS FLOOR NEUROMUSCULAR FUNCTION:  Strength:  1 and Unable to hold greater than 3 sec  Perineal Sensation:  Normal        PELVIC SUPPORT:  [place]:  3  Ant:  3  Post:  2  CST:  negative  UVJ: somewhat hypermobile    Her pessary was removed, cleaned, and reinserted without difficulty.    Impression:    ICD-10-CM    1. Uterovaginal prolapse, incomplete  N81.2       2. Postmenopausal atrophic vaginitis  N95.2       3. Pelvic muscle wasting  N81.84           Discussion Items:   Discussed mgmt of vulvovaginal atrophy with vaginal estrogen cream. Reviewed associated benefits, risks, alternatives, and goals. Recommend low dose 2x weekly mgmt.     Discussed management of pelvic organ prolapse including but not limited to behavioral  modifications, conservative options, and surgical management.   Discussed pessary management including benefits and risks. Discussed importance of keeping regularly scheduled pessary checks in prevention of complications related to pessary use.     Treatment Plan:  Continue pessary management, office care  Continue vag estrogen as prescribed  Encouraged daily pelvic exercises  Cont bowel regimen  Call with s/sx of UTI, problems with pessary     All questions answered  Pt understands and agrees to plan       Return in about 8 weeks (around 3/11/2025) for pessary care.      Sera Mcdonald PA-C    Note to patient: The 21st Century Cures Act makes medical notes like these available to patients in the interest of transparency.  However, be advised this is a medical document.  It is intended as peer to peer communication.  It is written in medical language and may contain abbreviations or verbiage that are unfamiliar.  It may appear blunt or direct.  Medical documents are intended to carry relevant information, facts as evident, and the clinical opinion of the practitioner.

## 2025-03-11 ENCOUNTER — OFFICE VISIT (OUTPATIENT)
Dept: UROLOGY | Facility: HOSPITAL | Age: 80
End: 2025-03-11
Attending: PHYSICIAN ASSISTANT
Payer: MEDICARE

## 2025-03-11 VITALS — HEIGHT: 63 IN | RESPIRATION RATE: 18 BRPM | WEIGHT: 153 LBS | BODY MASS INDEX: 27.11 KG/M2

## 2025-03-11 DIAGNOSIS — N95.2 POSTMENOPAUSAL ATROPHIC VAGINITIS: ICD-10-CM

## 2025-03-11 DIAGNOSIS — N81.2 UTEROVAGINAL PROLAPSE, INCOMPLETE: Primary | ICD-10-CM

## 2025-03-11 DIAGNOSIS — N81.84 PELVIC MUSCLE WASTING: ICD-10-CM

## 2025-03-11 PROCEDURE — 99212 OFFICE O/P EST SF 10 MIN: CPT

## 2025-03-11 RX ORDER — ESTRADIOL 0.1 MG/G
CREAM VAGINAL
Qty: 42 G | Refills: 3 | Status: SHIPPED | OUTPATIENT
Start: 2025-03-11

## 2025-03-11 NOTE — PROGRESS NOTES
Pt presents for f/u of pelvic organ prolapse, pessary care  Using #4 LS gellhorn pessary, office care    Reports pessary is comfortable   Denies discharge  Denies bleeding  Denies s/sx of UTI  Bowels reg  Pt is using vaginal estrogen cream 2x weekly    Happy with pessary, feels supported  She is not currently interested in surgical management of her pelvic organ prolapse    Urogynecology Summary:  Urogynecology Summary  Prolapse: No  VALENTIN: No  Urge Incontinence: No  Nocturia Frequency: 2  Frequency: 2 hours  Constipation: No  Wears pad day?:  (1-2)  Activities are limited by UI/POP?: No      Vitals:  Vitals:    03/11/25 1149   Resp: 18       GENERAL EXAM:  GENERAL: alert & oriented, NAD  HEENT: NC/AT, sclera without injection  SKIN: no rashes  LUNGS:  without increased respiratory effort  ABDOMEN: soft, non-tender, non-distended, no masses appreciated  EXT: no edema    PELVIC EXAM: PRISCA Mcgee, present for exam as a chaperone  Ext. Gen: +atrophy, no lesions  Urethra: +atrophy, nontender, +caruncle  Bladder: no fullness, nontender  Vagina: +atrophy  Cervix: no bleeding, no lesions, nontender  Uterus: +mobile  Adnexa:no masses, nontender  Perineum: nontender  Anus: wnl  Rectum: defer     PELVIS FLOOR NEUROMUSCULAR FUNCTION:  Strength:  1 and Unable to hold greater than 3 sec  Perineal Sensation:  Normal        PELVIC SUPPORT:  [place]:  3  Ant:  3  Post:  2  CST:  negative  UVJ: somewhat hypermobile    Her pessary was removed, cleaned, and reinserted without difficulty.    Impression:    ICD-10-CM    1. Uterovaginal prolapse, incomplete  N81.2       2. Postmenopausal atrophic vaginitis  N95.2 estradiol (ESTRACE) 0.1 MG/GM Vaginal Cream      3. Pelvic muscle wasting  N81.84           Discussion Items:   Discussed mgmt of vulvovaginal atrophy with vaginal estrogen cream. Reviewed associated benefits, risks, alternatives, and goals. Recommend low dose 2-3x weekly mgmt.     Discussed management of pelvic organ prolapse  including but not limited to behavioral modifications, conservative options, and surgical management.   Discussed pessary management including benefits and risks. Discussed importance of keeping regularly scheduled pessary checks in prevention of complications related to pessary use.     Treatment Plan:  Continue pessary management, office care  Continue vag estrogen as prescribed  Encouraged daily pelvic exercises  Cont bowel regimen  Call with s/sx of UTI, problems with pessary     All questions answered  Pt understands and agrees to plan       Return in about 10 weeks (around 5/20/2025) for pessary care.      Sera Mcdonald PA-C    Note to patient: The 21st Century Cures Act makes medical notes like these available to patients in the interest of transparency.  However, be advised this is a medical document.  It is intended as peer to peer communication.  It is written in medical language and may contain abbreviations or verbiage that are unfamiliar.  It may appear blunt or direct.  Medical documents are intended to carry relevant information, facts as evident, and the clinical opinion of the practitioner.

## 2025-05-20 ENCOUNTER — OFFICE VISIT (OUTPATIENT)
Dept: UROLOGY | Facility: HOSPITAL | Age: 80
End: 2025-05-20
Attending: PHYSICIAN ASSISTANT
Payer: MEDICARE

## 2025-05-20 VITALS — RESPIRATION RATE: 18 BRPM | WEIGHT: 153 LBS | HEIGHT: 63 IN | BODY MASS INDEX: 27.11 KG/M2

## 2025-05-20 DIAGNOSIS — N81.84 PELVIC MUSCLE WASTING: ICD-10-CM

## 2025-05-20 DIAGNOSIS — N95.2 POSTMENOPAUSAL ATROPHIC VAGINITIS: ICD-10-CM

## 2025-05-20 DIAGNOSIS — N81.2 UTEROVAGINAL PROLAPSE, INCOMPLETE: Primary | ICD-10-CM

## 2025-05-20 PROCEDURE — 99212 OFFICE O/P EST SF 10 MIN: CPT

## 2025-05-20 NOTE — PROGRESS NOTES
Pt presents for f/u of pelvic organ prolapse, pessary care  Using #4 LS gellhorn pessary, office care    Reports pessary is comfortable but needs to push up throughout the day  Denies discharge  Denies bleeding  Denies s/sx of UTI  Bowels reg  Pt is using vaginal estrogen cream 2x weekly    Happy with pessary, feels supported  She is not currently interested in surgical management of her pelvic organ prolapse    Urogynecology Summary:  Urogynecology Summary  Prolapse: No  VALENTIN: No  Urge Incontinence: Yes  Nocturia Frequency: 2 (Reports 1 to 2 times per night.)  Frequency: 2 - 3 hours  Incomplete emptying: No  Constipation: No  Wears pad day?: 2  Wears Pad Night?: 1  Activities are limited by UI/POP?: No  Currently Sexually Active: No  Avoids sexual activity due to: Other      Vitals:  Vitals:    05/20/25 1306   Resp: 18       GENERAL EXAM:  GENERAL: alert & oriented, NAD  HEENT: NC/AT, sclera without injection  SKIN: no rashes  LUNGS:  without increased respiratory effort  ABDOMEN: soft, non-tender, non-distended, no masses appreciated  EXT: no edema    PELVIC EXAM: BRIAN Magallanes, present for exam as a chaperone  Ext. Gen: +atrophy, no lesions  Urethra: +atrophy, nontender, +caruncle  Bladder: no fullness, nontender  Vagina: +atrophy  Cervix: no bleeding, no lesions, nontender  Uterus: +mobile  Adnexa: no masses, nontender  Perineum: nontender  Anus: wnl  Rectum: defer     PELVIS FLOOR NEUROMUSCULAR FUNCTION:  Strength:  1 and Unable to hold greater than 3 sec  Perineal Sensation:  Normal        PELVIC SUPPORT:  [place]:  3  Ant:  3  Post:  2  CST:  negative  UVJ: somewhat hypermobile     Her pessary was removed, cleaned, and discarded.    A #5 LS gellhorn pessary was inserted without difficulty.  Pt reported it to be comfortable and supportive.  Able to void freely.    Impression:    ICD-10-CM    1. Uterovaginal prolapse, incomplete  N81.2       2. Postmenopausal atrophic vaginitis  N95.2       3. Pelvic muscle wasting   N81.84           Discussion Items:   Discussed mgmt of vulvovaginal atrophy with vaginal estrogen cream. Reviewed associated benefits, risks, alternatives, and goals. Recommend low dose 2x weekly mgmt.     Discussed management of pelvic organ prolapse including but not limited to behavioral modifications, conservative options, and surgical management.   Discussed pessary management including benefits and risks. Discussed importance of keeping regularly scheduled pessary checks in prevention of complications related to pessary use.     Treatment Plan:  Continue pessary management, office care  Continue vag estrogen as prescribed  Encouraged daily pelvic exercises  Cont bowel regimen  Call with s/sx of UTI, problems with pessary     All questions answered  Pt understands and agrees to plan       Return in about 4 weeks (around 6/17/2025) for pessary care.      Sera Mcdonald PA-C    Note to patient: The 21st Century Cures Act makes medical notes like these available to patients in the interest of transparency.  However, be advised this is a medical document.  It is intended as peer to peer communication.  It is written in medical language and may contain abbreviations or verbiage that are unfamiliar.  It may appear blunt or direct.  Medical documents are intended to carry relevant information, facts as evident, and the clinical opinion of the practitioner.

## 2025-06-11 ENCOUNTER — OFFICE VISIT (OUTPATIENT)
Dept: UROLOGY | Facility: HOSPITAL | Age: 80
End: 2025-06-11
Attending: PHYSICIAN ASSISTANT
Payer: MEDICARE

## 2025-06-11 VITALS — BODY MASS INDEX: 27.11 KG/M2 | WEIGHT: 153 LBS | HEIGHT: 63 IN | RESPIRATION RATE: 18 BRPM

## 2025-06-11 DIAGNOSIS — N81.84 PELVIC MUSCLE WASTING: ICD-10-CM

## 2025-06-11 DIAGNOSIS — N95.2 POSTMENOPAUSAL ATROPHIC VAGINITIS: ICD-10-CM

## 2025-06-11 DIAGNOSIS — N81.2 UTEROVAGINAL PROLAPSE, INCOMPLETE: Primary | ICD-10-CM

## 2025-06-11 PROCEDURE — 99212 OFFICE O/P EST SF 10 MIN: CPT

## 2025-06-11 NOTE — PATIENT INSTRUCTIONS
Continue to use vaginal estrogen cream twice a week.    Call with any problems with the pessary, signs of urinary tract infection, or vaginal bleeding.    Follow up in 2 months.

## 2025-06-11 NOTE — PROGRESS NOTES
Pt presents for f/u of pelvic organ prolapse, pessary care  Using #5 LS gellhorn pessary, office care    Reports pessary is comfortable   Denies discharge  Denies bleeding  Denies s/sx of UTI  Bowels reg  Pt is using vaginal estrogen cream 2x weekly    Happy with pessary, feels supported  She is not currently interested in surgical management of her pelvic organ prolapse    Urogynecology Summary:  Urogynecology Summary  Prolapse: No  VALENTIN: No  Urge Incontinence: Yes (Reports over noc)  Nocturia Frequency: 2 (1-2x/noc)  Frequency: 2 - 3 hours  Incomplete emptying: No  Constipation: No  Wears pad day?: 2  Wears Pad Night?: 1  Activities are limited by UI/POP?: No  Currently Sexually Active: No  Avoids sexual activity due to: Other      Vitals:  Vitals:    06/11/25 1041   Resp: 18       GENERAL EXAM:  GENERAL: alert & oriented, NAD  HEENT: NC/AT, sclera without injection  SKIN: no rashes  LUNGS:  without increased respiratory effort  ABDOMEN: soft, non-tender, non-distended, no masses appreciated  EXT: no edema    PELVIC EXAM: BRIAN Mehta, present for exam as a chaperone  Ext. Gen: +atrophy, no lesions  Urethra: +atrophy, nontender, +caruncle  Bladder: no fullness, nontender  Vagina: +atrophy  Cervix: no bleeding, no lesions, nontender  Uterus: +mobile  Adnexa: no masses, nontender  Perineum: nontender  Anus: wnl  Rectum: defer     PELVIS FLOOR NEUROMUSCULAR FUNCTION:  Strength:  1 and Unable to hold greater than 3 sec  Perineal Sensation:  Normal        PELVIC SUPPORT:  [place]:  3  Ant:  3  Post:  2  CST:  negative  UVJ: somewhat hypermobile    Her pessary was removed, cleaned, and reinserted without difficulty.    Impression:    ICD-10-CM    1. Uterovaginal prolapse, incomplete  N81.2       2. Postmenopausal atrophic vaginitis  N95.2       3. Pelvic muscle wasting  N81.84           Discussion Items:   Discussed mgmt of vulvovaginal atrophy with vaginal estrogen cream. Reviewed associated benefits, risks, alternatives,  and goals. Recommend low dose 2x weekly mgmt.     Discussed management of pelvic organ prolapse including but not limited to behavioral modifications, conservative options, and surgical management.   Discussed pessary management including benefits and risks. Discussed importance of keeping regularly scheduled pessary checks in prevention of complications related to pessary use.     Treatment Plan:  Continue pessary management, office care  Continue vag estrogen as prescribed  Encouraged daily pelvic exercises  Cont bowel regimen  Call with s/sx of UTI, problems with pessary     All questions answered  Pt understands and agrees to plan       Return in about 2 months (around 8/11/2025) for pessary care.      Srea Mcdonald PA-C    Note to patient: The 21st Century Cures Act makes medical notes like these available to patients in the interest of transparency.  However, be advised this is a medical document.  It is intended as peer to peer communication.  It is written in medical language and may contain abbreviations or verbiage that are unfamiliar.  It may appear blunt or direct.  Medical documents are intended to carry relevant information, facts as evident, and the clinical opinion of the practitioner.

## 2025-08-27 ENCOUNTER — OFFICE VISIT (OUTPATIENT)
Dept: UROLOGY | Facility: HOSPITAL | Age: 80
End: 2025-08-27
Attending: PHYSICIAN ASSISTANT

## 2025-08-27 VITALS — WEIGHT: 153 LBS | BODY MASS INDEX: 27.11 KG/M2 | HEIGHT: 63 IN | RESPIRATION RATE: 18 BRPM

## 2025-08-27 DIAGNOSIS — N81.2 UTEROVAGINAL PROLAPSE, INCOMPLETE: Primary | ICD-10-CM

## 2025-08-27 DIAGNOSIS — N95.2 POSTMENOPAUSAL ATROPHIC VAGINITIS: ICD-10-CM

## 2025-08-27 DIAGNOSIS — N81.84 PELVIC MUSCLE WASTING: ICD-10-CM

## 2025-08-27 PROCEDURE — 99212 OFFICE O/P EST SF 10 MIN: CPT

## (undated) NOTE — LETTER
09/28/21        [facility]  [address] [facility] [address]      Dear Marleny Mac records indicate that you have outstanding lab work and or testing that was ordered for you and has not yet been completed:  Orders Placed This Encoun

## (undated) NOTE — MR AVS SNAPSHOT
[address] [address] [phone]               Thank you for choosing us for your health care visit with KAYLA Alex.   We are glad to serve you and happy to provide you wi Where to Get Your Medications      These medications were sent to [facility], [address], [phone]  [address] [place] [address], [address]     Phone:  [phone]    - Amoxicillin-Pot Clavulanate 875-125 MG Tabs

## (undated) NOTE — Clinical Note
Hello! I had the pleasure of seeing your patient, Seda Adams, today in the office and have routed the progress note to you for your review.  Thank you for the referral and please contact me if you have any questions.  Sincerely,  Emeka Mustafa [facility]  Office: [phone]

## (undated) NOTE — LETTER
Seda Adams, :1945    CONSENT FOR PROCEDURE/SEDATION    1. I authorize the performance upon Seda Adams  the following: Endometrial biopsy procedure    2. I authorize Dr. Emeka Guerrier MD (and whomever is designated as the doctor’s assistant), to perform the above-mentioned procedures.    3. If any unforeseen conditions arise during this procedure calling for additional  procedures, operations, or medications (including anesthesia and blood transfusion), I further request and authorize the doctor to do whatever he/she deems advisable in my interest.    4. I consent to the taking and reproduction of any photographs in the course of this procedure for professional purposes.    5. I consent to the administration of such sedation as may be considered necessary or advisable by the physician responsible for this service, with the exception of ______________________________________________________    6. I have been informed by my doctor of the nature and purpose of this procedure sedation, possible alternative methods of treatment, risk involved and possible complications.    7. If I have a Do Not Resuscitate (DNR) order in place, the physician and I (or the individual authorized to consent on my behalf) will discuss and agree as to whether the Do Not Resuscitate (DNR) order will remain in effect or will be discontinued during the performance of the procedure and the applicable recovery period. If the Do Not Resuscitate (DNR) order is discontinued and is to be reinstated following the procedure/recovery period, the physician will determine when the applicable recovery period ends for purposes of reinstating the Do Not Resuscitate (DNR) order.    Signature of Patient:_______________________________________________    Signature of person authorized to consent for patient:  _______________________________________________________________    Relationship to patient:  ____________________________________________    Witness: _________________________________________ Date:___________     Physician Signature: _______________________________ Date:___________

## (undated) NOTE — Clinical Note
FYI, TCM call made, see notes.  [facility] scheduled TCM HFU at the [facility] on 4/6/2020 at 10:00 am.

## (undated) NOTE — LETTER
08/17/20        Patt Olszewski  [address] [facility] [address]      Dear Jana Uriarte records indicate that you have outstanding lab work and or testing that was ordered for you and has not yet been completed:  Orders Placed This Encoun

## (undated) NOTE — LETTER
11/28/18        Anselmo Barthel  [address] [facility] [address]      Dear Mary Beth Steiner records indicate that you have outstanding lab work and or testing that was ordered for you and has not yet been completed:  Orders Placed This Encoun

## (undated) NOTE — LETTER
06/19/20        Tessie Cannon  [address] [facility] [address]      Dear Shirley Anderson records indicate that you have outstanding lab work and or testing that was ordered for you and has not yet been completed:  Orders Placed This Encoun

## (undated) NOTE — LETTER
01/11/21    [employer] [address] [facility] [address], [address]  Fax: [phone]    TO WHOM IT MAY CONCERN;     My name is Dr. Tammy Meza DO, a physician with the [employer], [place] [id number], [place], [address] phone [phone]

## (undated) NOTE — LETTER
[facility] [address], [address]  Consent for Procedure/Sedation    Date:     Time:       1. I authorize the performance upon Sadi Henry the following:  Loop recorder implant    2.  I authorize Dr. Flako Viveros (and whomever is de patient:    ________________________________    ___________________    Witness: _________________________      Date: ___________________    Printed: 6/3/2020   4:53 PM  Patient Name: Caio Jatinbrittani        : 1945       Medical Record #: [id number]

## (undated) NOTE — LETTER
Date & Time: 3/5/2021, 12:17 AM  Patient: Andrew Nap  Encounter Provider(s):     MD Cruz Salas MD         This certifies that Alexandra Alpers, a patient at an [facility], am leaving the facility vol

## (undated) NOTE — LETTER
01/12/21    [employer] [facility] [facility] [address] [facility], [address]     To whom it may concern;    My name is Dr. Faustino Desai DO, a physician with the [facility], [facility] [address], [address] phone [phone] still seems warranted. Please contact my office with further questions.      Thank you,   Reva Paz DO

## (undated) NOTE — LETTER
12/26/19        Hema Kamara  [address] [facility] [address]      Dear Pato Quach records indicate that you have outstanding lab work and or testing that was ordered for you and has not yet been completed:  Orders Placed This Encoun

## (undated) NOTE — LETTER
20    RE: Taz Noguera ( 1945)    To whom it may concern,    The above listed patient has been seen at [place] [address]. She has no underlying conditions that will preclude her from going to [place] for treatment.

## (undated) NOTE — LETTER
01/12/21    Faith Schulz and [address] [facility], [address] [facility]        TO WHOM IT MAY CONCERN     My name is Dr. Cassandra Daniel DO, a physician with the [place] [id number], [place], [address] phone [phone]